# Patient Record
Sex: MALE | Race: WHITE | Employment: OTHER | URBAN - METROPOLITAN AREA
[De-identification: names, ages, dates, MRNs, and addresses within clinical notes are randomized per-mention and may not be internally consistent; named-entity substitution may affect disease eponyms.]

---

## 2017-01-03 ENCOUNTER — TELEPHONE (OUTPATIENT)
Dept: CARDIOLOGY CLINIC | Age: 82
End: 2017-01-03

## 2017-01-09 ENCOUNTER — TELEPHONE (OUTPATIENT)
Dept: PULMONOLOGY | Age: 82
End: 2017-01-09

## 2017-02-14 ENCOUNTER — TELEPHONE (OUTPATIENT)
Dept: CARDIOLOGY CLINIC | Age: 82
End: 2017-02-14

## 2017-02-21 ENCOUNTER — NURSE ONLY (OUTPATIENT)
Dept: CARDIOLOGY CLINIC | Age: 82
End: 2017-02-21

## 2017-02-21 DIAGNOSIS — Z95.0 CARDIAC PACEMAKER: ICD-10-CM

## 2017-02-21 DIAGNOSIS — R00.1 BRADYCARDIA: ICD-10-CM

## 2017-02-21 PROCEDURE — 93294 REM INTERROG EVL PM/LDLS PM: CPT | Performed by: INTERNAL MEDICINE

## 2017-02-21 PROCEDURE — 93296 REM INTERROG EVL PM/IDS: CPT | Performed by: INTERNAL MEDICINE

## 2017-03-02 ENCOUNTER — OFFICE VISIT (OUTPATIENT)
Dept: CARDIOLOGY CLINIC | Age: 82
End: 2017-03-02

## 2017-03-02 VITALS
BODY MASS INDEX: 37.31 KG/M2 | WEIGHT: 260 LBS | SYSTOLIC BLOOD PRESSURE: 120 MMHG | HEART RATE: 68 BPM | DIASTOLIC BLOOD PRESSURE: 70 MMHG

## 2017-03-02 DIAGNOSIS — Z95.0 PACEMAKER: ICD-10-CM

## 2017-03-02 DIAGNOSIS — I25.10 CAD IN NATIVE ARTERY: Primary | ICD-10-CM

## 2017-03-02 DIAGNOSIS — R94.31 ABNORMAL EKG: ICD-10-CM

## 2017-03-02 DIAGNOSIS — Z98.61 POST PTCA: ICD-10-CM

## 2017-03-02 DIAGNOSIS — I35.0 NONRHEUMATIC AORTIC VALVE STENOSIS: ICD-10-CM

## 2017-03-02 DIAGNOSIS — I44.7 LBBB (LEFT BUNDLE BRANCH BLOCK): ICD-10-CM

## 2017-03-02 DIAGNOSIS — R00.1 BRADYCARDIA: ICD-10-CM

## 2017-03-02 PROCEDURE — 1123F ACP DISCUSS/DSCN MKR DOCD: CPT | Performed by: INTERNAL MEDICINE

## 2017-03-02 PROCEDURE — 1036F TOBACCO NON-USER: CPT | Performed by: INTERNAL MEDICINE

## 2017-03-02 PROCEDURE — G8417 CALC BMI ABV UP PARAM F/U: HCPCS | Performed by: INTERNAL MEDICINE

## 2017-03-02 PROCEDURE — G8484 FLU IMMUNIZE NO ADMIN: HCPCS | Performed by: INTERNAL MEDICINE

## 2017-03-02 PROCEDURE — G8427 DOCREV CUR MEDS BY ELIG CLIN: HCPCS | Performed by: INTERNAL MEDICINE

## 2017-03-02 PROCEDURE — G8598 ASA/ANTIPLAT THER USED: HCPCS | Performed by: INTERNAL MEDICINE

## 2017-03-02 PROCEDURE — 4040F PNEUMOC VAC/ADMIN/RCVD: CPT | Performed by: INTERNAL MEDICINE

## 2017-03-02 PROCEDURE — 99214 OFFICE O/P EST MOD 30 MIN: CPT | Performed by: INTERNAL MEDICINE

## 2017-05-23 ENCOUNTER — NURSE ONLY (OUTPATIENT)
Dept: CARDIOLOGY CLINIC | Age: 82
End: 2017-05-23

## 2017-05-23 DIAGNOSIS — Z95.0 CARDIAC PACEMAKER: ICD-10-CM

## 2017-05-23 DIAGNOSIS — R00.1 BRADYCARDIA: ICD-10-CM

## 2017-05-23 PROCEDURE — 93296 REM INTERROG EVL PM/IDS: CPT | Performed by: INTERNAL MEDICINE

## 2017-05-23 PROCEDURE — 93294 REM INTERROG EVL PM/LDLS PM: CPT | Performed by: INTERNAL MEDICINE

## 2017-05-26 ENCOUNTER — OFFICE VISIT (OUTPATIENT)
Dept: CARDIOLOGY CLINIC | Age: 82
End: 2017-05-26

## 2017-05-26 VITALS
SYSTOLIC BLOOD PRESSURE: 134 MMHG | BODY MASS INDEX: 38.31 KG/M2 | HEART RATE: 80 BPM | DIASTOLIC BLOOD PRESSURE: 78 MMHG | WEIGHT: 267 LBS

## 2017-05-26 DIAGNOSIS — R00.1 BRADYCARDIA: ICD-10-CM

## 2017-05-26 DIAGNOSIS — I44.7 LBBB (LEFT BUNDLE BRANCH BLOCK): ICD-10-CM

## 2017-05-26 DIAGNOSIS — Z95.0 PACEMAKER: ICD-10-CM

## 2017-05-26 DIAGNOSIS — Z98.61 POST PTCA: ICD-10-CM

## 2017-05-26 DIAGNOSIS — I25.10 CAD IN NATIVE ARTERY: Primary | ICD-10-CM

## 2017-05-26 DIAGNOSIS — R94.31 ABNORMAL EKG: ICD-10-CM

## 2017-05-26 DIAGNOSIS — I35.0 NONRHEUMATIC AORTIC VALVE STENOSIS: ICD-10-CM

## 2017-05-26 PROCEDURE — 1123F ACP DISCUSS/DSCN MKR DOCD: CPT | Performed by: INTERNAL MEDICINE

## 2017-05-26 PROCEDURE — 99214 OFFICE O/P EST MOD 30 MIN: CPT | Performed by: INTERNAL MEDICINE

## 2017-05-26 PROCEDURE — 4040F PNEUMOC VAC/ADMIN/RCVD: CPT | Performed by: INTERNAL MEDICINE

## 2017-05-26 PROCEDURE — 1036F TOBACCO NON-USER: CPT | Performed by: INTERNAL MEDICINE

## 2017-05-26 PROCEDURE — G8417 CALC BMI ABV UP PARAM F/U: HCPCS | Performed by: INTERNAL MEDICINE

## 2017-05-26 PROCEDURE — G8427 DOCREV CUR MEDS BY ELIG CLIN: HCPCS | Performed by: INTERNAL MEDICINE

## 2017-05-26 PROCEDURE — G8598 ASA/ANTIPLAT THER USED: HCPCS | Performed by: INTERNAL MEDICINE

## 2017-05-26 RX ORDER — CETIRIZINE HYDROCHLORIDE 10 MG/1
10 TABLET ORAL DAILY
COMMUNITY
End: 2018-01-01 | Stop reason: CLARIF

## 2017-08-08 ENCOUNTER — TELEPHONE (OUTPATIENT)
Dept: CARDIOLOGY CLINIC | Age: 82
End: 2017-08-08

## 2017-09-07 ENCOUNTER — OFFICE VISIT (OUTPATIENT)
Dept: CARDIOLOGY CLINIC | Age: 82
End: 2017-09-07

## 2017-09-07 ENCOUNTER — PROCEDURE VISIT (OUTPATIENT)
Dept: CARDIOLOGY CLINIC | Age: 82
End: 2017-09-07

## 2017-09-07 VITALS — DIASTOLIC BLOOD PRESSURE: 70 MMHG | WEIGHT: 263.4 LBS | BODY MASS INDEX: 37.79 KG/M2 | SYSTOLIC BLOOD PRESSURE: 130 MMHG

## 2017-09-07 DIAGNOSIS — I44.7 LBBB (LEFT BUNDLE BRANCH BLOCK): ICD-10-CM

## 2017-09-07 DIAGNOSIS — I35.0 NONRHEUMATIC AORTIC VALVE STENOSIS: ICD-10-CM

## 2017-09-07 DIAGNOSIS — R94.31 ABNORMAL EKG: ICD-10-CM

## 2017-09-07 DIAGNOSIS — Z95.0 CARDIAC PACEMAKER: ICD-10-CM

## 2017-09-07 DIAGNOSIS — I25.10 CAD IN NATIVE ARTERY: Primary | ICD-10-CM

## 2017-09-07 DIAGNOSIS — R00.1 BRADYCARDIA: ICD-10-CM

## 2017-09-07 DIAGNOSIS — Z98.61 POST PTCA: ICD-10-CM

## 2017-09-07 DIAGNOSIS — Z95.0 PACEMAKER: ICD-10-CM

## 2017-09-07 PROCEDURE — 1036F TOBACCO NON-USER: CPT | Performed by: INTERNAL MEDICINE

## 2017-09-07 PROCEDURE — G8598 ASA/ANTIPLAT THER USED: HCPCS | Performed by: INTERNAL MEDICINE

## 2017-09-07 PROCEDURE — 99214 OFFICE O/P EST MOD 30 MIN: CPT | Performed by: INTERNAL MEDICINE

## 2017-09-07 PROCEDURE — 4040F PNEUMOC VAC/ADMIN/RCVD: CPT | Performed by: INTERNAL MEDICINE

## 2017-09-07 PROCEDURE — G8427 DOCREV CUR MEDS BY ELIG CLIN: HCPCS | Performed by: INTERNAL MEDICINE

## 2017-09-07 PROCEDURE — G8417 CALC BMI ABV UP PARAM F/U: HCPCS | Performed by: INTERNAL MEDICINE

## 2017-09-07 PROCEDURE — 93280 PM DEVICE PROGR EVAL DUAL: CPT | Performed by: INTERNAL MEDICINE

## 2017-09-07 PROCEDURE — 1123F ACP DISCUSS/DSCN MKR DOCD: CPT | Performed by: INTERNAL MEDICINE

## 2017-12-05 ENCOUNTER — PROCEDURE VISIT (OUTPATIENT)
Dept: CARDIOLOGY CLINIC | Age: 82
End: 2017-12-05

## 2017-12-05 DIAGNOSIS — R00.1 BRADYCARDIA: ICD-10-CM

## 2017-12-05 DIAGNOSIS — Z95.0 CARDIAC PACEMAKER: ICD-10-CM

## 2017-12-05 PROCEDURE — 93280 PM DEVICE PROGR EVAL DUAL: CPT | Performed by: INTERNAL MEDICINE

## 2017-12-06 ENCOUNTER — OFFICE VISIT (OUTPATIENT)
Dept: CARDIOLOGY CLINIC | Age: 82
End: 2017-12-06

## 2017-12-06 VITALS
WEIGHT: 271 LBS | SYSTOLIC BLOOD PRESSURE: 128 MMHG | HEART RATE: 70 BPM | BODY MASS INDEX: 38.88 KG/M2 | DIASTOLIC BLOOD PRESSURE: 70 MMHG

## 2017-12-06 DIAGNOSIS — Z98.61 POST PTCA: ICD-10-CM

## 2017-12-06 DIAGNOSIS — I44.7 LBBB (LEFT BUNDLE BRANCH BLOCK): ICD-10-CM

## 2017-12-06 DIAGNOSIS — I25.10 CAD IN NATIVE ARTERY: Primary | ICD-10-CM

## 2017-12-06 DIAGNOSIS — R00.1 BRADYCARDIA: ICD-10-CM

## 2017-12-06 DIAGNOSIS — Z95.0 PACEMAKER: ICD-10-CM

## 2017-12-06 DIAGNOSIS — R94.31 ABNORMAL EKG: ICD-10-CM

## 2017-12-06 DIAGNOSIS — I35.0 NONRHEUMATIC AORTIC VALVE STENOSIS: ICD-10-CM

## 2017-12-06 PROCEDURE — 1123F ACP DISCUSS/DSCN MKR DOCD: CPT | Performed by: INTERNAL MEDICINE

## 2017-12-06 PROCEDURE — 99214 OFFICE O/P EST MOD 30 MIN: CPT | Performed by: INTERNAL MEDICINE

## 2017-12-06 PROCEDURE — 4040F PNEUMOC VAC/ADMIN/RCVD: CPT | Performed by: INTERNAL MEDICINE

## 2017-12-06 PROCEDURE — 1036F TOBACCO NON-USER: CPT | Performed by: INTERNAL MEDICINE

## 2017-12-06 PROCEDURE — G8598 ASA/ANTIPLAT THER USED: HCPCS | Performed by: INTERNAL MEDICINE

## 2017-12-06 PROCEDURE — G8417 CALC BMI ABV UP PARAM F/U: HCPCS | Performed by: INTERNAL MEDICINE

## 2017-12-06 PROCEDURE — G8484 FLU IMMUNIZE NO ADMIN: HCPCS | Performed by: INTERNAL MEDICINE

## 2017-12-06 PROCEDURE — G8427 DOCREV CUR MEDS BY ELIG CLIN: HCPCS | Performed by: INTERNAL MEDICINE

## 2017-12-06 NOTE — PROGRESS NOTES
Subjective:      Patient ID: Ignacio Mireles is a 80 y.o. male. HPI: Patient is here for  follow up pacemaker implant, AS, CAD,PTCA, bradycardia and AS. Has prostate Ca. Has met to spine. Has back discomfort. Has significant fatigue. Breathing unchanged. No chest pain. No edema. 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    Obesity     Prostate CA ([facility] [address] 4/21/2015    Prostate cancer ([facility] [address]     SOB (shortness of breath) on exertion     Unspecified sleep apnea     uses cpap     Past Surgical History:   Procedure Laterality Date    APPENDECTOMY      CHOLECYSTECTOMY, LAPAROSCOPIC  12/30/2011    WITH CHOLANGIOGRAM    COLONOSCOPY      COSMETIC SURGERY      FRACTURE SURGERY      HERNIA REPAIR      JOINT REPLACEMENT      left knee    MYRINGOTOMY Left 8/12/13    LEFT INSERTION OF PE TUBE LEFT, NASOPHARYNGOSCOPY    PTCA      SHOULDER ARTHROPLASTY      LT    TYMPANOSTOMY TUBE PLACEMENT  [id number]    MYRINGOTOMY   INSERTION OF PE TUBE -LEFT,EVALUATION OF     Social History     Social History    Marital status:      Spouse name: N/A    Number of children: N/A    Years of education: N/A     Occupational History     Retired     Social History Main Topics    Smoking status: Never Smoker    Smokeless tobacco: Never Used    Alcohol use No    Drug use: No    Sexual activity: Not on file     Other Topics Concern    Not on file     Social History Narrative    No narrative on file     FH reviewed    Review of Systems   Constitutional: Negative for activity change. Having significant  fatigue. Respiratory: Negative for chest tightness. Negative for apnea, cough, choking.   Has chronic shortness of breath. Cardiovascular: Negative for chest pain, palpitations and leg swelling. No PND or orthopnea. No tachycardia. Gastrointestinal: Negative for abdominal distention. Musculoskeletal: Negative for myalgias. Neurological: Negative for dizziness, syncope and light-headedness. Psychiatric/Behavioral: Negative for behavioral problems, confusion and agitation. Other systems reviewed negative as done. Objective:   Physical Exam   Constitutional: He is oriented to person, place, and time. He appears well-developed and well-nourished. No distress. HENT:   Head: Normocephalic and atraumatic. Eyes: Conjunctivae and EOM are normal. Right eye exhibits no discharge. Left eye exhibits no discharge. Neck: Normal range of motion. Neck supple. No JVD present. Cardiovascular: Normal rate, regular rhythm, S1 normal, S2 normal and normal heart sounds. Exam reveals no gallop. 1/2 syst murmur heard. Pulses:       Radial pulses are 2+ on the right side, and 2+ on the left side. Pulmonary/Chest: Effort normal, decreased BS. No respiratory distress. He has wheezes. He has no rales. Abdominal: Soft. Bowel sounds are normal. There is no tenderness. Musculoskeletal: Normal range of motion. He exhibits tr edema. Neurological: He is alert and oriented to person, place, and time. Skin: Skin is warm and dry. Psychiatric: He has a normal mood and affect. His behavior is normal. Thought content normal.       Assessment:      1. CAD in native artery     2. Post PTCA     3. Bradycardia     4. Pacemaker     5. Nonrheumatic aortic valve stenosis     6. LBBB (left bundle branch block)     7. Abnormal EKG             Plan:      CV stable. Now with metastatic prostate Ca which has mets to spine which explains most of his sx. (found on recent bone scan.)   No angina. Having asthma. No sx. Compensated. No syncope. EP following pacemaker. Encouraged diet and wt loss.   Reviewed previous

## 2018-01-01 PROBLEM — R06.09 DYSPNEA ON EXERTION: Status: ACTIVE | Noted: 2018-01-01

## 2018-01-02 PROBLEM — I50.41 ACUTE COMBINED SYSTOLIC AND DIASTOLIC CONGESTIVE HEART FAILURE (HCC): Status: ACTIVE | Noted: 2018-01-02

## 2018-01-02 PROBLEM — I42.0 DILATED CARDIOMYOPATHY (HCC): Status: ACTIVE | Noted: 2018-01-02

## 2018-01-03 ENCOUNTER — TELEPHONE (OUTPATIENT)
Dept: CARDIOLOGY CLINIC | Age: 83
End: 2018-01-03

## 2018-01-10 ENCOUNTER — OFFICE VISIT (OUTPATIENT)
Dept: CARDIOLOGY CLINIC | Age: 83
End: 2018-01-10

## 2018-01-10 VITALS
BODY MASS INDEX: 37.74 KG/M2 | HEART RATE: 60 BPM | DIASTOLIC BLOOD PRESSURE: 64 MMHG | WEIGHT: 263 LBS | SYSTOLIC BLOOD PRESSURE: 126 MMHG

## 2018-01-10 DIAGNOSIS — I44.7 LBBB (LEFT BUNDLE BRANCH BLOCK): ICD-10-CM

## 2018-01-10 DIAGNOSIS — I35.0 NONRHEUMATIC AORTIC VALVE STENOSIS: Primary | ICD-10-CM

## 2018-01-10 DIAGNOSIS — I25.10 CAD IN NATIVE ARTERY: ICD-10-CM

## 2018-01-10 DIAGNOSIS — Z98.61 POST PTCA: ICD-10-CM

## 2018-01-10 DIAGNOSIS — I50.22 CHRONIC SYSTOLIC CONGESTIVE HEART FAILURE (HCC): ICD-10-CM

## 2018-01-10 DIAGNOSIS — R00.1 BRADYCARDIA: ICD-10-CM

## 2018-01-10 DIAGNOSIS — Z95.0 PACEMAKER: ICD-10-CM

## 2018-01-10 PROCEDURE — G8427 DOCREV CUR MEDS BY ELIG CLIN: HCPCS | Performed by: INTERNAL MEDICINE

## 2018-01-10 PROCEDURE — 1123F ACP DISCUSS/DSCN MKR DOCD: CPT | Performed by: INTERNAL MEDICINE

## 2018-01-10 PROCEDURE — 99214 OFFICE O/P EST MOD 30 MIN: CPT | Performed by: INTERNAL MEDICINE

## 2018-01-10 PROCEDURE — 1036F TOBACCO NON-USER: CPT | Performed by: INTERNAL MEDICINE

## 2018-01-10 PROCEDURE — G8484 FLU IMMUNIZE NO ADMIN: HCPCS | Performed by: INTERNAL MEDICINE

## 2018-01-10 PROCEDURE — 1111F DSCHRG MED/CURRENT MED MERGE: CPT | Performed by: INTERNAL MEDICINE

## 2018-01-10 PROCEDURE — G8417 CALC BMI ABV UP PARAM F/U: HCPCS | Performed by: INTERNAL MEDICINE

## 2018-01-10 PROCEDURE — G8598 ASA/ANTIPLAT THER USED: HCPCS | Performed by: INTERNAL MEDICINE

## 2018-01-10 PROCEDURE — 4040F PNEUMOC VAC/ADMIN/RCVD: CPT | Performed by: INTERNAL MEDICINE

## 2018-01-10 RX ORDER — FUROSEMIDE 40 MG/1
80 TABLET ORAL 2 TIMES DAILY
Qty: 120 TABLET | Refills: 6 | Status: ON HOLD | OUTPATIENT
Start: 2018-01-10 | End: 2018-06-13

## 2018-01-10 NOTE — PROGRESS NOTES
Metastatic prostate Ca which has mets to spine which explains most of his sx. (found on recent bone scan.)   No angina. Having asthma. No sx. Compensated. No syncope. EP following pacemaker. Encouraged diet and wt loss. Reviewed previous records and testing including myoview 7/15. Follow up 1-2 wks.

## 2018-01-24 ENCOUNTER — TELEPHONE (OUTPATIENT)
Dept: CARDIOLOGY CLINIC | Age: 83
End: 2018-01-24

## 2018-02-02 ENCOUNTER — TELEPHONE (OUTPATIENT)
Dept: CARDIOLOGY CLINIC | Age: 83
End: 2018-02-02

## 2018-02-02 RX ORDER — METOLAZONE 2.5 MG/1
2.5 TABLET ORAL ONCE
Qty: 1 TABLET | Refills: 0 | Status: SHIPPED | OUTPATIENT
Start: 2018-02-02 | End: 2018-03-28

## 2018-02-06 ENCOUNTER — OFFICE VISIT (OUTPATIENT)
Dept: CARDIOLOGY CLINIC | Age: 83
End: 2018-02-06

## 2018-02-06 VITALS
BODY MASS INDEX: 38.22 KG/M2 | DIASTOLIC BLOOD PRESSURE: 80 MMHG | HEART RATE: 60 BPM | WEIGHT: 266.4 LBS | SYSTOLIC BLOOD PRESSURE: 132 MMHG

## 2018-02-06 DIAGNOSIS — I35.0 NONRHEUMATIC AORTIC VALVE STENOSIS: ICD-10-CM

## 2018-02-06 DIAGNOSIS — I42.0 DILATED CARDIOMYOPATHY (HCC): ICD-10-CM

## 2018-02-06 DIAGNOSIS — I25.10 CAD IN NATIVE ARTERY: ICD-10-CM

## 2018-02-06 DIAGNOSIS — R00.1 BRADYCARDIA: ICD-10-CM

## 2018-02-06 DIAGNOSIS — I44.7 LBBB (LEFT BUNDLE BRANCH BLOCK): ICD-10-CM

## 2018-02-06 DIAGNOSIS — I50.22 CHRONIC SYSTOLIC CONGESTIVE HEART FAILURE (HCC): Primary | ICD-10-CM

## 2018-02-06 DIAGNOSIS — Z98.61 POST PTCA: ICD-10-CM

## 2018-02-06 PROCEDURE — 1123F ACP DISCUSS/DSCN MKR DOCD: CPT | Performed by: INTERNAL MEDICINE

## 2018-02-06 PROCEDURE — 1036F TOBACCO NON-USER: CPT | Performed by: INTERNAL MEDICINE

## 2018-02-06 PROCEDURE — G8417 CALC BMI ABV UP PARAM F/U: HCPCS | Performed by: INTERNAL MEDICINE

## 2018-02-06 PROCEDURE — G8598 ASA/ANTIPLAT THER USED: HCPCS | Performed by: INTERNAL MEDICINE

## 2018-02-06 PROCEDURE — G8484 FLU IMMUNIZE NO ADMIN: HCPCS | Performed by: INTERNAL MEDICINE

## 2018-02-06 PROCEDURE — 99214 OFFICE O/P EST MOD 30 MIN: CPT | Performed by: INTERNAL MEDICINE

## 2018-02-06 PROCEDURE — G8427 DOCREV CUR MEDS BY ELIG CLIN: HCPCS | Performed by: INTERNAL MEDICINE

## 2018-02-06 PROCEDURE — 4040F PNEUMOC VAC/ADMIN/RCVD: CPT | Performed by: INTERNAL MEDICINE

## 2018-02-06 NOTE — PROGRESS NOTES
Subjective:      Patient ID: Lyndsey Rivero is a 80 y.o. male. HPI: Patient is here for  follow up pacemaker implant, AS, CAD,PTCA, bradycardia and AS. Has prostate Ca. Has met to spine. Has had nausea,incontinance. Thinks he has flu. Sinus issues. Has back discomfort which is worse. No fever/chills. Breathing unchanged. No chest pain. No edema. No orthopnea.          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    Obesity     Prostate CA ([facility] [address] 4/21/2015    Prostate cancer ([facility] [address]     SOB (shortness of breath) on exertion     Unspecified sleep apnea     uses cpap     Past Surgical History:   Procedure Laterality Date    APPENDECTOMY      CHOLECYSTECTOMY, LAPAROSCOPIC  12/30/2011    WITH CHOLANGIOGRAM    COLONOSCOPY      COSMETIC SURGERY      FRACTURE SURGERY      HERNIA REPAIR      JOINT REPLACEMENT      left knee    MYRINGOTOMY Left 8/12/13    LEFT INSERTION OF PE TUBE LEFT, NASOPHARYNGOSCOPY    PTCA      SHOULDER ARTHROPLASTY      LT    TYMPANOSTOMY TUBE PLACEMENT  [id number]    MYRINGOTOMY   INSERTION OF PE TUBE -LEFT,EVALUATION OF     Social History     Social History    Marital status:      Spouse name: N/A    Number of children: N/A    Years of education: N/A     Occupational History     Retired     Social History Main Topics    Smoking status: Never Smoker    Smokeless tobacco: Never Used    Alcohol use No    Drug use: No    Sexual activity: Not on file     Other Topics Concern    Not on file     Social History Narrative    No narrative on file     [facility] reviewed    Vitals:    02/06/18

## 2018-02-27 ENCOUNTER — OFFICE VISIT (OUTPATIENT)
Dept: CARDIOLOGY CLINIC | Age: 83
End: 2018-02-27

## 2018-02-27 ENCOUNTER — PROCEDURE VISIT (OUTPATIENT)
Dept: CARDIOLOGY CLINIC | Age: 83
End: 2018-02-27

## 2018-02-27 VITALS
HEART RATE: 62 BPM | WEIGHT: 265 LBS | DIASTOLIC BLOOD PRESSURE: 72 MMHG | SYSTOLIC BLOOD PRESSURE: 128 MMHG | BODY MASS INDEX: 38.02 KG/M2

## 2018-02-27 DIAGNOSIS — I42.0 DILATED CARDIOMYOPATHY (HCC): ICD-10-CM

## 2018-02-27 DIAGNOSIS — I35.0 NONRHEUMATIC AORTIC VALVE STENOSIS: ICD-10-CM

## 2018-02-27 DIAGNOSIS — I44.7 LBBB (LEFT BUNDLE BRANCH BLOCK): ICD-10-CM

## 2018-02-27 DIAGNOSIS — I50.22 CHRONIC SYSTOLIC CONGESTIVE HEART FAILURE (HCC): Primary | ICD-10-CM

## 2018-02-27 DIAGNOSIS — Z98.61 POST PTCA: ICD-10-CM

## 2018-02-27 DIAGNOSIS — R00.1 BRADYCARDIA: ICD-10-CM

## 2018-02-27 DIAGNOSIS — I25.10 CAD IN NATIVE ARTERY: ICD-10-CM

## 2018-02-27 DIAGNOSIS — Z95.0 PACEMAKER: Primary | ICD-10-CM

## 2018-02-27 PROCEDURE — G8417 CALC BMI ABV UP PARAM F/U: HCPCS | Performed by: INTERNAL MEDICINE

## 2018-02-27 PROCEDURE — 1123F ACP DISCUSS/DSCN MKR DOCD: CPT | Performed by: INTERNAL MEDICINE

## 2018-02-27 PROCEDURE — 99214 OFFICE O/P EST MOD 30 MIN: CPT | Performed by: INTERNAL MEDICINE

## 2018-02-27 PROCEDURE — G8427 DOCREV CUR MEDS BY ELIG CLIN: HCPCS | Performed by: INTERNAL MEDICINE

## 2018-02-27 PROCEDURE — 1036F TOBACCO NON-USER: CPT | Performed by: INTERNAL MEDICINE

## 2018-02-27 PROCEDURE — G8484 FLU IMMUNIZE NO ADMIN: HCPCS | Performed by: INTERNAL MEDICINE

## 2018-02-27 PROCEDURE — G8598 ASA/ANTIPLAT THER USED: HCPCS | Performed by: INTERNAL MEDICINE

## 2018-02-27 PROCEDURE — 4040F PNEUMOC VAC/ADMIN/RCVD: CPT | Performed by: INTERNAL MEDICINE

## 2018-02-27 NOTE — PROGRESS NOTES
Subjective:      Patient ID: Adarsh Scruggs is a 80 y.o. male. HPI: Patient is here for  follow up pacemaker implant, AS, CAD,PTCA, bradycardia and AS. Has prostate Ca. Has met to spine. He feels weakness is his biggest issues. Breathing is not the issue. No fever/chills. No chest pain. No edema. No orthopnea. 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    Obesity     Prostate CA ([facility] [address] 4/21/2015    Prostate cancer ([facility] [address]     SOB (shortness of breath) on exertion     Unspecified sleep apnea     uses cpap     Past Surgical History:   Procedure Laterality Date    APPENDECTOMY      CHOLECYSTECTOMY, LAPAROSCOPIC  12/30/2011    WITH CHOLANGIOGRAM    COLONOSCOPY      COSMETIC SURGERY      FRACTURE SURGERY      HERNIA REPAIR      JOINT REPLACEMENT      left knee    MYRINGOTOMY Left 8/12/13    LEFT INSERTION OF PE TUBE LEFT, NASOPHARYNGOSCOPY    PTCA      SHOULDER ARTHROPLASTY      LT    TYMPANOSTOMY TUBE PLACEMENT  [id number]    MYRINGOTOMY   INSERTION OF PE TUBE -LEFT,EVALUATION OF     Social History     Social History    Marital status:      Spouse name: N/A    Number of children: N/A    Years of education: N/A     Occupational History     Retired     Social History Main Topics    Smoking status: Never Smoker    Smokeless tobacco: Never Used    Alcohol use No    Drug use: No    Sexual activity: Not on file     Other Topics Concern    Not on file     Social History Narrative    No narrative on file     FH reviewed      Review of Systems   Constitutional: Negative for activity change.  Having

## 2018-03-15 ENCOUNTER — PROCEDURE VISIT (OUTPATIENT)
Dept: CARDIOLOGY CLINIC | Age: 83
End: 2018-03-15

## 2018-03-15 ENCOUNTER — OFFICE VISIT (OUTPATIENT)
Dept: CARDIOLOGY CLINIC | Age: 83
End: 2018-03-15

## 2018-03-15 VITALS
SYSTOLIC BLOOD PRESSURE: 130 MMHG | OXYGEN SATURATION: 98 % | RESPIRATION RATE: 16 BRPM | HEIGHT: 70 IN | BODY MASS INDEX: 37.94 KG/M2 | WEIGHT: 265 LBS | HEART RATE: 71 BPM | DIASTOLIC BLOOD PRESSURE: 80 MMHG

## 2018-03-15 DIAGNOSIS — I50.22 CHRONIC SYSTOLIC CONGESTIVE HEART FAILURE (HCC): Primary | ICD-10-CM

## 2018-03-15 DIAGNOSIS — Z95.0 CARDIAC PACEMAKER: ICD-10-CM

## 2018-03-15 DIAGNOSIS — I44.7 LBBB (LEFT BUNDLE BRANCH BLOCK): ICD-10-CM

## 2018-03-15 DIAGNOSIS — Z95.0 PACEMAKER: ICD-10-CM

## 2018-03-15 DIAGNOSIS — I35.0 NONRHEUMATIC AORTIC VALVE STENOSIS: ICD-10-CM

## 2018-03-15 DIAGNOSIS — I25.10 CAD IN NATIVE ARTERY: ICD-10-CM

## 2018-03-15 DIAGNOSIS — I42.0 DILATED CARDIOMYOPATHY (HCC): ICD-10-CM

## 2018-03-15 DIAGNOSIS — Z98.61 POST PTCA: ICD-10-CM

## 2018-03-15 DIAGNOSIS — R00.1 BRADYCARDIA: ICD-10-CM

## 2018-03-15 PROCEDURE — 4040F PNEUMOC VAC/ADMIN/RCVD: CPT | Performed by: INTERNAL MEDICINE

## 2018-03-15 PROCEDURE — G8484 FLU IMMUNIZE NO ADMIN: HCPCS | Performed by: INTERNAL MEDICINE

## 2018-03-15 PROCEDURE — 93280 PM DEVICE PROGR EVAL DUAL: CPT | Performed by: INTERNAL MEDICINE

## 2018-03-15 PROCEDURE — 99214 OFFICE O/P EST MOD 30 MIN: CPT | Performed by: INTERNAL MEDICINE

## 2018-03-15 PROCEDURE — G8427 DOCREV CUR MEDS BY ELIG CLIN: HCPCS | Performed by: INTERNAL MEDICINE

## 2018-03-15 PROCEDURE — 1036F TOBACCO NON-USER: CPT | Performed by: INTERNAL MEDICINE

## 2018-03-15 PROCEDURE — G8598 ASA/ANTIPLAT THER USED: HCPCS | Performed by: INTERNAL MEDICINE

## 2018-03-15 PROCEDURE — G8417 CALC BMI ABV UP PARAM F/U: HCPCS | Performed by: INTERNAL MEDICINE

## 2018-03-15 PROCEDURE — 1123F ACP DISCUSS/DSCN MKR DOCD: CPT | Performed by: INTERNAL MEDICINE

## 2018-03-15 NOTE — PROGRESS NOTES
Interrogation and programming evaluation of the device shows normal function, with stable sensing and pacing thresholds. See interrogation for details. The pt saw Dr Leann Law today. Recheck remotely 3 mos.

## 2018-03-28 ENCOUNTER — OFFICE VISIT (OUTPATIENT)
Dept: ENT CLINIC | Age: 83
End: 2018-03-28

## 2018-03-28 VITALS — OXYGEN SATURATION: 95 % | DIASTOLIC BLOOD PRESSURE: 82 MMHG | SYSTOLIC BLOOD PRESSURE: 120 MMHG | HEART RATE: 76 BPM

## 2018-03-28 DIAGNOSIS — J30.2 CHRONIC SEASONAL ALLERGIC RHINITIS, UNSPECIFIED TRIGGER: Primary | ICD-10-CM

## 2018-03-28 PROCEDURE — G8427 DOCREV CUR MEDS BY ELIG CLIN: HCPCS | Performed by: OTOLARYNGOLOGY

## 2018-03-28 PROCEDURE — 4040F PNEUMOC VAC/ADMIN/RCVD: CPT | Performed by: OTOLARYNGOLOGY

## 2018-03-28 PROCEDURE — 99214 OFFICE O/P EST MOD 30 MIN: CPT | Performed by: OTOLARYNGOLOGY

## 2018-03-28 PROCEDURE — G8417 CALC BMI ABV UP PARAM F/U: HCPCS | Performed by: OTOLARYNGOLOGY

## 2018-03-28 PROCEDURE — 1123F ACP DISCUSS/DSCN MKR DOCD: CPT | Performed by: OTOLARYNGOLOGY

## 2018-03-28 PROCEDURE — G8598 ASA/ANTIPLAT THER USED: HCPCS | Performed by: OTOLARYNGOLOGY

## 2018-03-28 PROCEDURE — 1036F TOBACCO NON-USER: CPT | Performed by: OTOLARYNGOLOGY

## 2018-03-28 PROCEDURE — G8484 FLU IMMUNIZE NO ADMIN: HCPCS | Performed by: OTOLARYNGOLOGY

## 2018-03-28 RX ORDER — FLUTICASONE PROPIONATE 50 MCG
1 SPRAY, SUSPENSION (ML) NASAL DAILY
Qty: 1 BOTTLE | Refills: 1 | Status: SHIPPED | OUTPATIENT
Start: 2018-03-28 | End: 2019-09-04 | Stop reason: SDUPTHER

## 2018-03-28 NOTE — PROGRESS NOTES
The patient is an 66-year-old who is referred here from the [facility] emergency unit after he presented there recently with complaints of ongoing lightheadedness     The workup did not show any acute neurological or cardiovascular events and they suspected his sinuses were involved. The patient has had a slightly viscous noncolored drainage requiring him to clear his throat. There has been some pressure in his chest as well which she suspects may be due from the drainage from above. He also gives a lengthy history of recent treatment for metastatic prostate cancer and congestive heart failure. His recent visit to his cardiologist was unremarkable and he believes his blood pressure is under control. There is an ongoing lightheaded sensation that is not related to position. There has been no subjective or objective vertigo nor any associated nausea. He denies any change in vision or loss of consciousness. There has been no recent ear trauma or barotrauma. He denies any severe headaches, but rather a vague lightheaded sensation that makes him tentative when he walks. The patient has been known to us in the past for seasonal allergies. He had a tympanostomy tube placed elsewhere for ongoing left eustachian tube dysfunction. He is also followed for hypertension, hyperlipidemia, obstructive sleep apnea, and prediabetes. The patient is a non smoker and is not exposed to second hand smoke or irritants. There have been no known contagious contacts. There are  no other symptoms referable to the upper aerodigestive tract. GENERAL:   The patient appears well developed and well nourished. The head is normocephalic and atraumatic; no facial scars or weakness are noted. The facial skeleton is normal.The voice has a normal quality and tonality to it. EARS:  The pinnae are normal bilaterally. The ear canals are clear with no cerumen or narrowing.   Both eardrums are normal Impression:    Mild chronic rhinitis, no suggestion of secondary sinusitis or otitis    No suggestion of vestibular etiology for his disequilibrium    Several comorbidities may be contributing as well    Plan:  Begin fluticasone 2 puffs daily  Balance precautions discussed

## 2018-04-09 ENCOUNTER — OFFICE VISIT (OUTPATIENT)
Dept: CARDIOLOGY CLINIC | Age: 83
End: 2018-04-09

## 2018-04-09 VITALS
BODY MASS INDEX: 39.03 KG/M2 | DIASTOLIC BLOOD PRESSURE: 80 MMHG | SYSTOLIC BLOOD PRESSURE: 134 MMHG | WEIGHT: 272 LBS | HEART RATE: 68 BPM

## 2018-04-09 DIAGNOSIS — Z95.0 PACEMAKER: ICD-10-CM

## 2018-04-09 DIAGNOSIS — Z98.61 HISTORY OF PTCA: ICD-10-CM

## 2018-04-09 DIAGNOSIS — I25.10 CAD IN NATIVE ARTERY: ICD-10-CM

## 2018-04-09 DIAGNOSIS — I35.0 NONRHEUMATIC AORTIC VALVE STENOSIS: ICD-10-CM

## 2018-04-09 DIAGNOSIS — I44.7 LBBB (LEFT BUNDLE BRANCH BLOCK): ICD-10-CM

## 2018-04-09 DIAGNOSIS — R00.1 BRADYCARDIA: ICD-10-CM

## 2018-04-09 DIAGNOSIS — I50.22 CHRONIC SYSTOLIC CONGESTIVE HEART FAILURE (HCC): Primary | ICD-10-CM

## 2018-04-09 DIAGNOSIS — I42.0 DILATED CARDIOMYOPATHY (HCC): ICD-10-CM

## 2018-04-09 PROCEDURE — 1036F TOBACCO NON-USER: CPT | Performed by: INTERNAL MEDICINE

## 2018-04-09 PROCEDURE — G8428 CUR MEDS NOT DOCUMENT: HCPCS | Performed by: INTERNAL MEDICINE

## 2018-04-09 PROCEDURE — 1123F ACP DISCUSS/DSCN MKR DOCD: CPT | Performed by: INTERNAL MEDICINE

## 2018-04-09 PROCEDURE — 4040F PNEUMOC VAC/ADMIN/RCVD: CPT | Performed by: INTERNAL MEDICINE

## 2018-04-09 PROCEDURE — G8598 ASA/ANTIPLAT THER USED: HCPCS | Performed by: INTERNAL MEDICINE

## 2018-04-09 PROCEDURE — 99214 OFFICE O/P EST MOD 30 MIN: CPT | Performed by: INTERNAL MEDICINE

## 2018-04-09 PROCEDURE — G8417 CALC BMI ABV UP PARAM F/U: HCPCS | Performed by: INTERNAL MEDICINE

## 2018-05-03 ENCOUNTER — OFFICE VISIT (OUTPATIENT)
Dept: CARDIOLOGY CLINIC | Age: 83
End: 2018-05-03

## 2018-05-03 VITALS
HEART RATE: 66 BPM | DIASTOLIC BLOOD PRESSURE: 70 MMHG | HEIGHT: 70 IN | BODY MASS INDEX: 37.51 KG/M2 | SYSTOLIC BLOOD PRESSURE: 140 MMHG | WEIGHT: 262 LBS

## 2018-05-03 DIAGNOSIS — I35.0 NONRHEUMATIC AORTIC VALVE STENOSIS: Primary | ICD-10-CM

## 2018-05-03 DIAGNOSIS — R00.1 BRADYCARDIA: ICD-10-CM

## 2018-05-03 DIAGNOSIS — I25.10 CORONARY ARTERY DISEASE DUE TO LIPID RICH PLAQUE: ICD-10-CM

## 2018-05-03 DIAGNOSIS — Z95.0 PACEMAKER: ICD-10-CM

## 2018-05-03 DIAGNOSIS — I42.0 DILATED CARDIOMYOPATHY (HCC): ICD-10-CM

## 2018-05-03 DIAGNOSIS — I25.83 CORONARY ARTERY DISEASE DUE TO LIPID RICH PLAQUE: ICD-10-CM

## 2018-05-03 PROCEDURE — 4040F PNEUMOC VAC/ADMIN/RCVD: CPT | Performed by: INTERNAL MEDICINE

## 2018-05-03 PROCEDURE — G8427 DOCREV CUR MEDS BY ELIG CLIN: HCPCS | Performed by: INTERNAL MEDICINE

## 2018-05-03 PROCEDURE — G8598 ASA/ANTIPLAT THER USED: HCPCS | Performed by: INTERNAL MEDICINE

## 2018-05-03 PROCEDURE — 1036F TOBACCO NON-USER: CPT | Performed by: INTERNAL MEDICINE

## 2018-05-03 PROCEDURE — 1123F ACP DISCUSS/DSCN MKR DOCD: CPT | Performed by: INTERNAL MEDICINE

## 2018-05-03 PROCEDURE — 99214 OFFICE O/P EST MOD 30 MIN: CPT | Performed by: INTERNAL MEDICINE

## 2018-05-03 PROCEDURE — G8417 CALC BMI ABV UP PARAM F/U: HCPCS | Performed by: INTERNAL MEDICINE

## 2018-05-07 ENCOUNTER — TELEPHONE (OUTPATIENT)
Dept: CARDIOLOGY CLINIC | Age: 83
End: 2018-05-07

## 2018-05-10 ENCOUNTER — HOSPITAL ENCOUNTER (OUTPATIENT)
Dept: NON INVASIVE DIAGNOSTICS | Age: 83
Discharge: HOME | End: 2018-05-10
Attending: INTERNAL MEDICINE | Admitting: INTERNAL MEDICINE

## 2018-05-10 VITALS — WEIGHT: 260.14 LBS | BODY MASS INDEX: 37.33 KG/M2

## 2018-05-10 DIAGNOSIS — I35.0 NONRHEUMATIC AORTIC VALVE STENOSIS: ICD-10-CM

## 2018-05-10 RX ORDER — DOBUTAMINE HYDROCHLORIDE 200 MG/100ML
10 INJECTION INTRAVENOUS CONTINUOUS
Status: DISCONTINUED | OUTPATIENT
Start: 2018-05-10 | End: 2018-05-10 | Stop reason: SDUPTHER

## 2018-05-10 RX ORDER — DOBUTAMINE HYDROCHLORIDE 200 MG/100ML
10 INJECTION INTRAVENOUS CONTINUOUS
Status: DISCONTINUED | OUTPATIENT
Start: 2018-05-10 | End: 2018-05-11 | Stop reason: HOSPADM

## 2018-05-10 RX ADMIN — DOBUTAMINE HYDROCHLORIDE 7.5 MCG/KG/MIN: 200 INJECTION INTRAVENOUS at 10:30

## 2018-05-11 ENCOUNTER — TELEPHONE (OUTPATIENT)
Dept: CARDIOLOGY | Age: 83
End: 2018-05-11

## 2018-05-11 DIAGNOSIS — I35.0 AORTIC STENOSIS, MODERATE: Primary | Chronic | ICD-10-CM

## 2018-05-15 ENCOUNTER — TELEPHONE (OUTPATIENT)
Dept: CARDIOLOGY CLINIC | Age: 83
End: 2018-05-15

## 2018-05-15 DIAGNOSIS — I25.83 CORONARY ARTERY DISEASE DUE TO LIPID RICH PLAQUE: Primary | Chronic | ICD-10-CM

## 2018-05-15 DIAGNOSIS — I65.23 OCCLUSION AND STENOSIS OF BILATERAL CAROTID ARTERIES: ICD-10-CM

## 2018-05-15 DIAGNOSIS — I25.10 CORONARY ARTERY DISEASE DUE TO LIPID RICH PLAQUE: Primary | Chronic | ICD-10-CM

## 2018-05-15 DIAGNOSIS — I35.0 AORTIC STENOSIS, MODERATE: Chronic | ICD-10-CM

## 2018-05-18 ENCOUNTER — TELEPHONE (OUTPATIENT)
Dept: CARDIOLOGY CLINIC | Age: 83
End: 2018-05-18

## 2018-05-22 ENCOUNTER — HOSPITAL ENCOUNTER (OUTPATIENT)
Dept: CT IMAGING | Age: 83
Discharge: HOME | End: 2018-05-22
Attending: INTERNAL MEDICINE | Admitting: INTERNAL MEDICINE

## 2018-05-22 DIAGNOSIS — I35.0 AORTIC STENOSIS, MODERATE: Chronic | ICD-10-CM

## 2018-05-22 DIAGNOSIS — I25.10 ATHEROSCLEROTIC HEART DISEASE OF NATIVE CORONARY ARTERY WITHOUT ANGINA PECTORIS: ICD-10-CM

## 2018-05-23 ENCOUNTER — TELEPHONE (OUTPATIENT)
Dept: CARDIOLOGY CLINIC | Age: 83
End: 2018-05-23

## 2018-06-05 ENCOUNTER — TELEPHONE (OUTPATIENT)
Dept: CARDIOLOGY CLINIC | Age: 83
End: 2018-06-05

## 2018-06-06 ENCOUNTER — OFFICE VISIT (OUTPATIENT)
Dept: CARDIOTHORACIC SURGERY | Age: 83
End: 2018-06-06

## 2018-06-06 VITALS
SYSTOLIC BLOOD PRESSURE: 130 MMHG | OXYGEN SATURATION: 96 % | TEMPERATURE: 98.2 F | HEIGHT: 70 IN | HEART RATE: 69 BPM | BODY MASS INDEX: 37.94 KG/M2 | DIASTOLIC BLOOD PRESSURE: 70 MMHG | WEIGHT: 265 LBS

## 2018-06-06 DIAGNOSIS — I35.0 NONRHEUMATIC AORTIC VALVE STENOSIS: Primary | ICD-10-CM

## 2018-06-06 DIAGNOSIS — I10 ESSENTIAL HYPERTENSION: ICD-10-CM

## 2018-06-06 DIAGNOSIS — E66.9 OBESITY (BMI 35.0-39.9 WITHOUT COMORBIDITY): ICD-10-CM

## 2018-06-06 PROCEDURE — 99213 OFFICE O/P EST LOW 20 MIN: CPT | Performed by: THORACIC SURGERY (CARDIOTHORACIC VASCULAR SURGERY)

## 2018-06-06 ASSESSMENT — ENCOUNTER SYMPTOMS
EYE REDNESS: 0
SHORTNESS OF BREATH: 1
BACK PAIN: 0
BLOOD IN STOOL: 0
ABDOMINAL PAIN: 0
COUGH: 0
CONSTIPATION: 1
ALLERGIC/IMMUNOLOGIC NEGATIVE: 1
EYE PAIN: 0
CHEST TIGHTNESS: 1

## 2018-06-07 ENCOUNTER — OFFICE VISIT (OUTPATIENT)
Dept: CARDIOLOGY CLINIC | Age: 83
End: 2018-06-07

## 2018-06-07 ENCOUNTER — HOSPITAL ENCOUNTER (OUTPATIENT)
Dept: OTHER | Age: 83
Discharge: HOME | End: 2018-06-07
Attending: INTERNAL MEDICINE | Admitting: INTERNAL MEDICINE

## 2018-06-07 VITALS
DIASTOLIC BLOOD PRESSURE: 80 MMHG | BODY MASS INDEX: 37.8 KG/M2 | HEART RATE: 65 BPM | RESPIRATION RATE: 16 BRPM | HEIGHT: 70 IN | TEMPERATURE: 97.5 F | OXYGEN SATURATION: 96 % | WEIGHT: 264 LBS | SYSTOLIC BLOOD PRESSURE: 144 MMHG

## 2018-06-07 VITALS
WEIGHT: 264 LBS | HEIGHT: 70 IN | HEART RATE: 66 BPM | DIASTOLIC BLOOD PRESSURE: 70 MMHG | SYSTOLIC BLOOD PRESSURE: 140 MMHG | BODY MASS INDEX: 37.8 KG/M2

## 2018-06-07 DIAGNOSIS — R00.1 BRADYCARDIA: ICD-10-CM

## 2018-06-07 DIAGNOSIS — Z01.811 PRE-OP CHEST EXAM: ICD-10-CM

## 2018-06-07 DIAGNOSIS — I25.83 CORONARY ARTERY DISEASE DUE TO LIPID RICH PLAQUE: ICD-10-CM

## 2018-06-07 DIAGNOSIS — I25.10 CORONARY ARTERY DISEASE DUE TO LIPID RICH PLAQUE: ICD-10-CM

## 2018-06-07 DIAGNOSIS — I35.0 AORTIC STENOSIS, MODERATE: Primary | ICD-10-CM

## 2018-06-07 DIAGNOSIS — Z01.818 PREOP EXAMINATION: ICD-10-CM

## 2018-06-07 LAB
ABO/RH: NORMAL
ALBUMIN SERPL-MCNC: 4.3 G/DL (ref 3.4–5)
ALP BLD-CCNC: 98 U/L (ref 40–129)
ALT SERPL-CCNC: 18 U/L (ref 10–40)
ANION GAP SERPL CALCULATED.3IONS-SCNC: 16 MMOL/L (ref 3–16)
ANTIBODY SCREEN: NORMAL
AST SERPL-CCNC: 33 U/L (ref 15–37)
BASOPHILS ABSOLUTE: 0 K/UL (ref 0–0.2)
BASOPHILS RELATIVE PERCENT: 0.6 %
BILIRUB SERPL-MCNC: 0.5 MG/DL (ref 0–1)
BILIRUBIN DIRECT: <0.2 MG/DL (ref 0–0.3)
BILIRUBIN URINE: NEGATIVE
BILIRUBIN, INDIRECT: NORMAL MG/DL (ref 0–1)
BLOOD, URINE: NEGATIVE
BUN BLDV-MCNC: 24 MG/DL (ref 7–20)
CALCIUM SERPL-MCNC: 10.1 MG/DL (ref 8.3–10.6)
CHLORIDE BLD-SCNC: 94 MMOL/L (ref 99–110)
CLARITY: CLEAR
CO2: 27 MMOL/L (ref 21–32)
COLOR: YELLOW
CREAT SERPL-MCNC: 1.1 MG/DL (ref 0.8–1.3)
EOSINOPHILS ABSOLUTE: 0.4 K/UL (ref 0–0.6)
EOSINOPHILS RELATIVE PERCENT: 4.8 %
GFR AFRICAN AMERICAN: >60
GFR NON-AFRICAN AMERICAN: >60
GLUCOSE BLD-MCNC: 117 MG/DL (ref 70–99)
GLUCOSE URINE: NEGATIVE MG/DL
HCT VFR BLD CALC: 41.4 % (ref 40.5–52.5)
HEMOGLOBIN: 14.1 G/DL (ref 13.5–17.5)
INR BLD: 1 (ref 0.86–1.14)
KETONES, URINE: NEGATIVE MG/DL
LEUKOCYTE ESTERASE, URINE: NEGATIVE
LYMPHOCYTES ABSOLUTE: 3.8 K/UL (ref 1–5.1)
LYMPHOCYTES RELATIVE PERCENT: 51.6 %
MAGNESIUM: 2.5 MG/DL (ref 1.8–2.4)
MCH RBC QN AUTO: 30.1 PG (ref 26–34)
MCHC RBC AUTO-ENTMCNC: 34.1 G/DL (ref 31–36)
MCV RBC AUTO: 88.3 FL (ref 80–100)
MICROSCOPIC EXAMINATION: NORMAL
MONOCYTES ABSOLUTE: 0.7 K/UL (ref 0–1.3)
MONOCYTES RELATIVE PERCENT: 9.2 %
NEUTROPHILS ABSOLUTE: 2.5 K/UL (ref 1.7–7.7)
NEUTROPHILS RELATIVE PERCENT: 33.8 %
NITRITE, URINE: NEGATIVE
PDW BLD-RTO: 13.7 % (ref 12.4–15.4)
PH UA: 6.5
PLATELET # BLD: 206 K/UL (ref 135–450)
PMV BLD AUTO: 9.6 FL (ref 5–10.5)
POTASSIUM SERPL-SCNC: 3.9 MMOL/L (ref 3.5–5.1)
PRO-BNP: 1731 PG/ML (ref 0–449)
PROTEIN UA: NEGATIVE MG/DL
PROTHROMBIN TIME: 11.4 SEC (ref 9.8–13)
RBC # BLD: 4.69 M/UL (ref 4.2–5.9)
SODIUM BLD-SCNC: 137 MMOL/L (ref 136–145)
SPECIFIC GRAVITY UA: 1.01
TOTAL PROTEIN: 7.9 G/DL (ref 6.4–8.2)
TROPONIN: <0.01 NG/ML
URINE REFLEX TO CULTURE: NORMAL
URINE TYPE: NORMAL
UROBILINOGEN, URINE: 0.2 E.U./DL
WBC # BLD: 7.3 K/UL (ref 4–11)

## 2018-06-07 PROCEDURE — G8427 DOCREV CUR MEDS BY ELIG CLIN: HCPCS | Performed by: INTERNAL MEDICINE

## 2018-06-07 PROCEDURE — 1036F TOBACCO NON-USER: CPT | Performed by: INTERNAL MEDICINE

## 2018-06-07 PROCEDURE — 1123F ACP DISCUSS/DSCN MKR DOCD: CPT | Performed by: INTERNAL MEDICINE

## 2018-06-07 PROCEDURE — 4040F PNEUMOC VAC/ADMIN/RCVD: CPT | Performed by: INTERNAL MEDICINE

## 2018-06-07 PROCEDURE — G8598 ASA/ANTIPLAT THER USED: HCPCS | Performed by: INTERNAL MEDICINE

## 2018-06-07 PROCEDURE — G8417 CALC BMI ABV UP PARAM F/U: HCPCS | Performed by: INTERNAL MEDICINE

## 2018-06-07 PROCEDURE — 99214 OFFICE O/P EST MOD 30 MIN: CPT | Performed by: INTERNAL MEDICINE

## 2018-06-07 ASSESSMENT — ENCOUNTER SYMPTOMS: SHORTNESS OF BREATH: 1

## 2018-06-07 ASSESSMENT — COPD QUESTIONNAIRES: CAT_SEVERITY: MODERATE

## 2018-06-08 ENCOUNTER — TELEPHONE (OUTPATIENT)
Dept: CARDIOLOGY CLINIC | Age: 83
End: 2018-06-08

## 2018-06-11 RX ORDER — SODIUM CHLORIDE, SODIUM LACTATE, POTASSIUM CHLORIDE, CALCIUM CHLORIDE 600; 310; 30; 20 MG/100ML; MG/100ML; MG/100ML; MG/100ML
INJECTION, SOLUTION INTRAVENOUS CONTINUOUS
Status: CANCELLED | OUTPATIENT
Start: 2018-06-12

## 2018-06-11 RX ORDER — CEFAZOLIN SODIUM 2 G/100ML
2 INJECTION, SOLUTION INTRAVENOUS ONCE
Status: CANCELLED | OUTPATIENT
Start: 2018-06-12

## 2018-06-12 ENCOUNTER — HOSPITAL ENCOUNTER (OUTPATIENT)
Dept: OPERATING ROOM | Age: 83
Discharge: HOME | End: 2018-06-12
Attending: INTERNAL MEDICINE | Admitting: INTERNAL MEDICINE

## 2018-06-12 RX ORDER — NICOTINE POLACRILEX 4 MG
15 LOZENGE BUCCAL PRN
Status: DISCONTINUED | OUTPATIENT
Start: 2018-06-12 | End: 2018-06-13 | Stop reason: HOSPADM

## 2018-06-12 RX ORDER — DEXTROSE MONOHYDRATE 25 G/50ML
12.5 INJECTION, SOLUTION INTRAVENOUS PRN
Status: DISCONTINUED | OUTPATIENT
Start: 2018-06-12 | End: 2018-06-13 | Stop reason: HOSPADM

## 2018-06-12 RX ORDER — DEXTROSE MONOHYDRATE 50 MG/ML
100 INJECTION, SOLUTION INTRAVENOUS PRN
Status: DISCONTINUED | OUTPATIENT
Start: 2018-06-12 | End: 2018-06-13 | Stop reason: HOSPADM

## 2018-06-14 ENCOUNTER — TELEPHONE (OUTPATIENT)
Dept: CARDIOLOGY CLINIC | Age: 83
End: 2018-06-14

## 2018-06-14 RX ORDER — FAMOTIDINE 20 MG/1
TABLET, FILM COATED ORAL
Qty: 180 TABLET | Refills: 3 | Status: SHIPPED | OUTPATIENT
Start: 2018-06-14 | End: 2019-05-13 | Stop reason: ALTCHOICE

## 2018-06-14 RX ORDER — CLOPIDOGREL BISULFATE 75 MG/1
TABLET ORAL
Qty: 90 TABLET | Refills: 5 | Status: SHIPPED | OUTPATIENT
Start: 2018-06-14 | End: 2018-12-03 | Stop reason: SDUPTHER

## 2018-06-15 ENCOUNTER — TELEPHONE (OUTPATIENT)
Dept: CARDIOLOGY CLINIC | Age: 83
End: 2018-06-15

## 2018-06-18 ENCOUNTER — TELEPHONE (OUTPATIENT)
Dept: CARDIOLOGY CLINIC | Age: 83
End: 2018-06-18

## 2018-06-20 ENCOUNTER — TELEPHONE (OUTPATIENT)
Dept: CARDIOLOGY CLINIC | Age: 83
End: 2018-06-20

## 2018-06-22 ENCOUNTER — TELEPHONE (OUTPATIENT)
Dept: CARDIOLOGY CLINIC | Age: 83
End: 2018-06-22

## 2018-06-27 ENCOUNTER — OFFICE VISIT (OUTPATIENT)
Dept: CARDIOLOGY CLINIC | Age: 83
End: 2018-06-27

## 2018-06-27 VITALS
OXYGEN SATURATION: 98 % | HEART RATE: 60 BPM | BODY MASS INDEX: 37.22 KG/M2 | HEIGHT: 70 IN | WEIGHT: 260 LBS | SYSTOLIC BLOOD PRESSURE: 138 MMHG | DIASTOLIC BLOOD PRESSURE: 80 MMHG

## 2018-06-27 DIAGNOSIS — I35.0 NONRHEUMATIC AORTIC VALVE STENOSIS: ICD-10-CM

## 2018-06-27 DIAGNOSIS — R00.1 BRADYCARDIA: ICD-10-CM

## 2018-06-27 DIAGNOSIS — I35.0 AORTIC STENOSIS, MODERATE: Chronic | ICD-10-CM

## 2018-06-27 DIAGNOSIS — I34.0 NON-RHEUMATIC MITRAL REGURGITATION: ICD-10-CM

## 2018-06-27 DIAGNOSIS — I25.83 CORONARY ARTERY DISEASE DUE TO LIPID RICH PLAQUE: Chronic | ICD-10-CM

## 2018-06-27 DIAGNOSIS — I25.10 CORONARY ARTERY DISEASE DUE TO LIPID RICH PLAQUE: Chronic | ICD-10-CM

## 2018-06-27 DIAGNOSIS — I44.7 LBBB (LEFT BUNDLE BRANCH BLOCK): ICD-10-CM

## 2018-06-27 DIAGNOSIS — I51.7 LVH (LEFT VENTRICULAR HYPERTROPHY): ICD-10-CM

## 2018-06-27 DIAGNOSIS — Z95.3 STATUS POST TRANSCATHETER AORTIC VALVE REPLACEMENT (TAVR) USING BIOPROSTHESIS: Primary | ICD-10-CM

## 2018-06-27 PROCEDURE — 93000 ELECTROCARDIOGRAM COMPLETE: CPT | Performed by: INTERNAL MEDICINE

## 2018-06-27 PROCEDURE — 99214 OFFICE O/P EST MOD 30 MIN: CPT | Performed by: INTERNAL MEDICINE

## 2018-06-27 PROCEDURE — 4040F PNEUMOC VAC/ADMIN/RCVD: CPT | Performed by: INTERNAL MEDICINE

## 2018-06-27 PROCEDURE — G8417 CALC BMI ABV UP PARAM F/U: HCPCS | Performed by: INTERNAL MEDICINE

## 2018-06-27 PROCEDURE — 1111F DSCHRG MED/CURRENT MED MERGE: CPT | Performed by: INTERNAL MEDICINE

## 2018-06-27 PROCEDURE — G8598 ASA/ANTIPLAT THER USED: HCPCS | Performed by: INTERNAL MEDICINE

## 2018-06-27 PROCEDURE — G8427 DOCREV CUR MEDS BY ELIG CLIN: HCPCS | Performed by: INTERNAL MEDICINE

## 2018-06-27 PROCEDURE — 1036F TOBACCO NON-USER: CPT | Performed by: INTERNAL MEDICINE

## 2018-06-27 PROCEDURE — 1123F ACP DISCUSS/DSCN MKR DOCD: CPT | Performed by: INTERNAL MEDICINE

## 2018-07-02 ENCOUNTER — TELEPHONE (OUTPATIENT)
Dept: CARDIOLOGY CLINIC | Age: 83
End: 2018-07-02

## 2018-07-03 ENCOUNTER — NURSE ONLY (OUTPATIENT)
Dept: CARDIOLOGY CLINIC | Age: 83
End: 2018-07-03

## 2018-07-03 DIAGNOSIS — Z95.0 CARDIAC PACEMAKER: ICD-10-CM

## 2018-07-03 DIAGNOSIS — R00.1 BRADYCARDIA: ICD-10-CM

## 2018-07-03 PROCEDURE — 93294 REM INTERROG EVL PM/LDLS PM: CPT | Performed by: INTERNAL MEDICINE

## 2018-07-03 PROCEDURE — 93296 REM INTERROG EVL PM/IDS: CPT | Performed by: INTERNAL MEDICINE

## 2018-07-09 ENCOUNTER — TELEPHONE (OUTPATIENT)
Dept: CARDIOLOGY CLINIC | Age: 83
End: 2018-07-09

## 2018-07-10 NOTE — PROGRESS NOTES
Carelink transmission shows normal device function. Battery and lead function stable. See 32 secs VT. Will review with EP. No AF. See Paceart report under cardiology tab. Echo from June '18 shows LVEF .35-.40. Dr Loyd Smoker note from then states that pt is doing very well. He will follow up with [facility] 7/19/18. Recheck pacer remotely 3 mos.  mk

## 2018-07-16 ENCOUNTER — TELEPHONE (OUTPATIENT)
Dept: CARDIOLOGY CLINIC | Age: 83
End: 2018-07-16

## 2018-07-17 PROBLEM — I25.10 CORONARY ARTERY DISEASE INVOLVING NATIVE CORONARY ARTERY OF NATIVE HEART WITHOUT ANGINA PECTORIS: Status: ACTIVE | Noted: 2018-07-17

## 2018-07-17 PROBLEM — Z95.2 S/P TAVR (TRANSCATHETER AORTIC VALVE REPLACEMENT): Status: ACTIVE | Noted: 2018-07-17

## 2018-07-17 NOTE — PROGRESS NOTES
[address]       H+P // CONSULT // OUTPATIENT VISIT // [facility] VISIT     Referring Doctor Dee Padgett MD   Encounter Type Followup     CHIEF COMPLAINT     VisitType [] Acute [x] Chronic     Symptom [] None [] CP [] SOB [] Dizzy [] Palps [x] Fatigue     Problems AS, CAD, Bradycardia      HISTORY OF PRESENT ILLNESS     Doing very well. No cp, improved sob. Still fatigued. Symptom Y N Frequency Duration Severity Modifying Assoc Sx Other   CP [] [x]         SOB [] [x]         Dizzy [] [x]         Syncope [] [x]         Palpitations [] [x]           COMPLIANCE     Category Meds Diet Salt Exercise Tobacco Alcohol Drugs   Compliant [x] [] [] [] [x] [x] [x]   [x]Counseling given on all above above categories    HISTORY/ALLERGIES/ROS     MedHx:  has a past medical history of Acute combined systolic and diastolic congestive heart failure ([facility] [address] Allergic; Anemia; Anxiety; Aortic stenosis; Arthritis; BPH (benign prostatic hypertrophy); Bradycardia; CAD (coronary artery disease); Depression; Diabetes mellitus ([facility] [address] Erectile dysfunction; Hyperlipidemia; Hypertension; Nasal defect; Obesity; Prostate CA ([facility] [address] Prostate cancer ([facility] [address] SOB (shortness of breath) on exertion; and Unspecified sleep apnea. SurgHx:  has a past surgical history that includes hernia repair; Appendectomy; Total shoulder arthroplasty; Colonoscopy; Cholecystectomy, laparoscopic (12/30/2011); Percutaneous Transluminal Coronary Angio; joint replacement; Tympanostomy tube placement (466046); Cosmetic surgery; fracture surgery; and myringotomy (Left, 8/12/13). SocHx:  reports that he has never smoked. He has never used smokeless tobacco. He reports that he does not drink alcohol or use drugs. FamHx: family history includes Coronary Art Dis in an other family member; Early Death in his father; Heart Disease in his father. Allergies: Neosporin [neomycin-bacitracin zn-polymyx];  Sulfa antibiotics; and Codeine   ROS:  [x]Full ROS obtained and negative except as mentioned in HPI     MEDICATIONS      Current Outpatient Prescriptions   Medication Sig Dispense Refill    famotidine (PEPCID) 20 MG tablet TAKE 1 TABLET BY MOUTH TWICE DAILY 180 tablet 3    clopidogrel (PLAVIX) 75 MG tablet TAKE 1 TABLET BY MOUTH EVERY DAY 90 tablet 5    furosemide (LASIX) 40 MG tablet Take 1 tablet by mouth 2 times daily 120 tablet 6    fluticasone (FLONASE) 50 MCG/ACT nasal spray 1 spray by Nasal route daily 1 Bottle 1    budesonide-formoterol (SYMBICORT) 160-4.5 MCG/ACT AERO Inhale 2 puffs into the lungs 2 times daily      glipiZIDE (GLUCOTROL XL) 2.5 MG extended release tablet Take 2.5 mg by mouth daily      metoprolol tartrate (LOPRESSOR) 25 MG tablet Take 12.5 mg by mouth 2 times daily       potassium chloride (KLOR-CON) 10 MEQ extended release tablet Take 10 mEq by mouth 2 times daily      aspirin 81 MG tablet Take 81 mg by mouth daily      pravastatin (PRAVACHOL) 40 MG tablet TAKE ONE TABLET BY MOUTH EVERY DAY AT BEDTIME 90 tablet 3    amLODIPine (NORVASC) 10 MG tablet TAKE ONE TABLET BY MOUTH EVERY DAY 90 tablet 1    gemfibrozil (LOPID) 600 MG tablet TAKE ONE TABLET BY MOUTH TWICE DAILY 180 tablet 2     No current facility-administered medications for this visit.       Reviewed with patient and will remain unchanged except as mentioned in A/P  PHYSICAL EXAM     Vitals:    07/19/18 1442   BP: (!) 150/72   Pulse:       Gen Alert, coop, no distress Heart  RRR, no MRG, nl apical impulse   Head NC, AT, no abnorm Abd  Soft, NT, +BS, no mass, no OM   Eyes PER, conj/corn clear Ext  Ext nl, AT, no C/C/E   Nose Nares nl, no drain, NT Pulse 2+ and symmetric   Throat Lips, mucosa, tongue nl Skin Col/text/turg nl, no vis rash/les   Neck S/S, TM, NT, no bruit/JVD Psych Nl mood and affect   Lung CTA-B, unlabored, no DTP Lymph   No cervical or axillary LA   Ch wall NT, no deform Neuro  Nl gross M/S exam     ASSESSMENT AND PLAN     ~AS   Date EF Detail   Sx   No

## 2018-07-18 ENCOUNTER — HOSPITAL ENCOUNTER (OUTPATIENT)
Dept: NON INVASIVE DIAGNOSTICS | Age: 83
Discharge: HOME | End: 2018-07-18
Attending: INTERNAL MEDICINE | Admitting: INTERNAL MEDICINE

## 2018-07-18 DIAGNOSIS — Z95.3 STATUS POST TRANSCATHETER AORTIC VALVE REPLACEMENT (TAVR) USING BIOPROSTHESIS: ICD-10-CM

## 2018-07-18 DIAGNOSIS — I35.0 NONRHEUMATIC AORTIC VALVE STENOSIS: ICD-10-CM

## 2018-07-18 LAB
LEFT VENTRICULAR EJECTION FRACTION HIGH VALUE: 40 %
LEFT VENTRICULAR EJECTION FRACTION MODE: NORMAL
LV EF: 35 %
LV EF: 38 %
LVEF MODALITY: NORMAL

## 2018-07-19 ENCOUNTER — OFFICE VISIT (OUTPATIENT)
Dept: CARDIOLOGY CLINIC | Age: 83
End: 2018-07-19

## 2018-07-19 VITALS
WEIGHT: 261 LBS | SYSTOLIC BLOOD PRESSURE: 150 MMHG | DIASTOLIC BLOOD PRESSURE: 72 MMHG | HEART RATE: 88 BPM | BODY MASS INDEX: 37.37 KG/M2 | HEIGHT: 70 IN

## 2018-07-19 DIAGNOSIS — I25.10 CORONARY ARTERY DISEASE INVOLVING NATIVE CORONARY ARTERY OF NATIVE HEART WITHOUT ANGINA PECTORIS: ICD-10-CM

## 2018-07-19 DIAGNOSIS — R00.1 BRADYCARDIA: ICD-10-CM

## 2018-07-19 DIAGNOSIS — I35.0 NONRHEUMATIC AORTIC VALVE STENOSIS: Primary | ICD-10-CM

## 2018-07-19 DIAGNOSIS — Z95.2 S/P TAVR (TRANSCATHETER AORTIC VALVE REPLACEMENT): ICD-10-CM

## 2018-07-19 PROCEDURE — 1101F PT FALLS ASSESS-DOCD LE1/YR: CPT | Performed by: INTERNAL MEDICINE

## 2018-07-19 PROCEDURE — 1123F ACP DISCUSS/DSCN MKR DOCD: CPT | Performed by: INTERNAL MEDICINE

## 2018-07-19 PROCEDURE — G8598 ASA/ANTIPLAT THER USED: HCPCS | Performed by: INTERNAL MEDICINE

## 2018-07-19 PROCEDURE — G8417 CALC BMI ABV UP PARAM F/U: HCPCS | Performed by: INTERNAL MEDICINE

## 2018-07-19 PROCEDURE — 99214 OFFICE O/P EST MOD 30 MIN: CPT | Performed by: INTERNAL MEDICINE

## 2018-07-19 PROCEDURE — 4040F PNEUMOC VAC/ADMIN/RCVD: CPT | Performed by: INTERNAL MEDICINE

## 2018-07-19 PROCEDURE — 1036F TOBACCO NON-USER: CPT | Performed by: INTERNAL MEDICINE

## 2018-07-19 PROCEDURE — G8427 DOCREV CUR MEDS BY ELIG CLIN: HCPCS | Performed by: INTERNAL MEDICINE

## 2018-07-19 NOTE — LETTER
Head NC, AT, no abnorm Abd  Soft, NT, +BS, no mass, no OM   Eyes PER, conj/corn clear Ext  Ext nl, AT, no C/C/E   Nose Nares nl, no drain, NT Pulse 2+ and symmetric   Throat Lips, mucosa, tongue nl Skin Col/text/turg nl, no vis rash/les   Neck S/S, TM, NT, no bruit/JVD Psych Nl mood and affect   Lung CTA-B, unlabored, no DTP Lymph   No cervical or axillary LA   Ch wall NT, no deform Neuro  Nl gross M/S exam     ASSESSMENT AND PLAN     ~AS   Date EF Detail   Sx   No concerning   Hx 6/18  TAVR 29 mm ES3   NYHA   []I         [x]II           []III          []IV   TTE 4/12 6/16 12/17 6/18 7/18 65%  55%  40%  40%  40% Mild AS, MG 14  Mod AS, MG 24  Mod to Sev AS, MG 37  TAVR well seated, MG 9  TAVR well seated MG 12mmHg   STTE 5/18 40% Sev AS MG 46   Plan   Doing well. No concerning sx   ~CAD   Date EF Results   Sx   SOB   [facility] 2006 5/18    PTCA of ?  50% mid LAD, 50% mid Cx, 50% mid RCA   MPI 12/17 35% Fixed inferior apical defect, no ischemia   Plan   Continue aggressive medical treatment at doses above   ~Bradycardia   s/p Pacemaker 6/16   VT noted on pacemaker was intraoperative TAVR and correlates with rapid pacing  No indication for VT workup/treatment  ~Followup  []2 wk   []1m   [x]3m    []6m   []12m    []PRN  []Other:     Scribe Attestation:  I, Angie Farr, am scribing for and in the presence of Kerri Reddy MD.   Signed, Angie Farr 07/17/18 8:31 AM   Provider Arnie Mccullough is working as a scribe for and in the presence of me (Kerri Reddy MD). Working as a scribe, Angie Farr may have prepopulated components of this note with my historical  intellectual property under my direct supervision. Any additions to this intellectual property were performed in my presence and at my direction. Furthermore, the content and accuracy of this note have been reviewed by me Kerri Reddy MD). If you have questions, please do not hesitate to call me.  I look forward

## 2018-07-20 NOTE — COMMUNICATION BODY
[address] [facility] [address]       H+P // CONSULT // OUTPATIENT VISIT // [facility] VISIT     Referring Doctor Ashu Braun MD   Encounter Type Followup     CHIEF COMPLAINT     VisitType [] Acute [x] Chronic     Symptom [] None [] CP [] SOB [] Dizzy [] Palps [x] Fatigue     Problems AS, CAD, Bradycardia      HISTORY OF PRESENT ILLNESS     Doing very well. No cp, improved sob. Still fatigued. Symptom Y N Frequency Duration Severity Modifying Assoc Sx Other   CP [] [x]         SOB [] [x]         Dizzy [] [x]         Syncope [] [x]         Palpitations [] [x]           COMPLIANCE     Category Meds Diet Salt Exercise Tobacco Alcohol Drugs   Compliant [x] [] [] [] [x] [x] [x]   [x]Counseling given on all above above categories    HISTORY/ALLERGIES/ROS     MedHx:  has a past medical history of Acute combined systolic and diastolic congestive heart failure ([facility] [address] Allergic; Anemia; Anxiety; Aortic stenosis; Arthritis; BPH (benign prostatic hypertrophy); Bradycardia; CAD (coronary artery disease); Depression; Diabetes mellitus ([address] Erectile dysfunction; Hyperlipidemia; Hypertension; Nasal defect; Obesity; Prostate CA ([facility] [address] Prostate cancer ([facility] [address] SOB (shortness of breath) on exertion; and Unspecified sleep apnea. SurgHx:  has a past surgical history that includes hernia repair; Appendectomy; Total shoulder arthroplasty; Colonoscopy; Cholecystectomy, laparoscopic (12/30/2011); Percutaneous Transluminal Coronary Angio; joint replacement; Tympanostomy tube placement (245673); Cosmetic surgery; fracture surgery; and myringotomy (Left, 8/12/13). SocHx:  reports that he has never smoked. He has never used smokeless tobacco. He reports that he does not drink alcohol or use drugs. FamHx: family history includes Coronary Art Dis in an other family member; Early Death in his father; Heart Disease in his father. Allergies: Neosporin [neomycin-bacitracin zn-polymyx];  Sulfa antibiotics; and Codeine   ROS:  [x]Full ROS concerning   Hx 6/18  TAVR 29 mm ES3   NYHA   []I         [x]II           []III          []IV   TTE 4/12 6/16 12/17 6/18 7/18 65%  55%  40%  40%  40% Mild AS, MG 14  Mod AS, MG 24  Mod to Sev AS, MG 37  TAVR well seated, MG 9  TAVR well seated MG 12mmHg   STTE 5/18 40% Sev AS MG 46   Plan   Doing well. No concerning sx   ~CAD   Date EF Results   Sx   SOB   [facility] 2006 5/18    PTCA of ?  50% mid LAD, 50% mid Cx, 50% mid RCA   MPI 12/17 35% Fixed inferior apical defect, no ischemia   Plan   Continue aggressive medical treatment at doses above   ~Bradycardia   s/p Pacemaker 6/16   VT noted on pacemaker was intraoperative TAVR and correlates with rapid pacing  No indication for VT workup/treatment  ~Followup  []2 wk   []1m   [x]3m    []6m   []12m    []PRN  []Other:     Scribe Attestation:  IJenni, am scribing for and in the presence of Nancy Marquez MD.   SignedJenni 07/17/18 8:31 AM   Provider Kwame Nino is working as a scribe for and in the presence of me (Nancy Marquez MD). Working as a scribe, Jenni Martines may have prepopulated components of this note with my historical  intellectual property under my direct supervision. Any additions to this intellectual property were performed in my presence and at my direction. Furthermore, the content and accuracy of this note have been reviewed by me Nancy Marquez MD).

## 2018-07-27 ENCOUNTER — OFFICE VISIT (OUTPATIENT)
Dept: ENT CLINIC | Age: 83
End: 2018-07-27

## 2018-07-27 VITALS
WEIGHT: 267 LBS | DIASTOLIC BLOOD PRESSURE: 84 MMHG | BODY MASS INDEX: 38.22 KG/M2 | OXYGEN SATURATION: 95 % | HEART RATE: 64 BPM | SYSTOLIC BLOOD PRESSURE: 130 MMHG | HEIGHT: 70 IN

## 2018-07-27 DIAGNOSIS — J30.1 ACUTE SEASONAL ALLERGIC RHINITIS DUE TO POLLEN: Primary | ICD-10-CM

## 2018-07-27 PROCEDURE — 1036F TOBACCO NON-USER: CPT | Performed by: OTOLARYNGOLOGY

## 2018-07-27 PROCEDURE — G8427 DOCREV CUR MEDS BY ELIG CLIN: HCPCS | Performed by: OTOLARYNGOLOGY

## 2018-07-27 PROCEDURE — 99213 OFFICE O/P EST LOW 20 MIN: CPT | Performed by: OTOLARYNGOLOGY

## 2018-07-27 PROCEDURE — 1101F PT FALLS ASSESS-DOCD LE1/YR: CPT | Performed by: OTOLARYNGOLOGY

## 2018-07-27 PROCEDURE — 4040F PNEUMOC VAC/ADMIN/RCVD: CPT | Performed by: OTOLARYNGOLOGY

## 2018-07-27 PROCEDURE — G8417 CALC BMI ABV UP PARAM F/U: HCPCS | Performed by: OTOLARYNGOLOGY

## 2018-07-27 PROCEDURE — G8598 ASA/ANTIPLAT THER USED: HCPCS | Performed by: OTOLARYNGOLOGY

## 2018-07-27 PROCEDURE — 1123F ACP DISCUSS/DSCN MKR DOCD: CPT | Performed by: OTOLARYNGOLOGY

## 2018-07-27 RX ORDER — AZELASTINE 1 MG/ML
2 SPRAY, METERED NASAL 2 TIMES DAILY
Qty: 1 BOTTLE | Refills: 1 | Status: SHIPPED | OUTPATIENT
Start: 2018-07-27 | End: 2019-10-14

## 2018-07-27 NOTE — PROGRESS NOTES
clear. There is no polyp, ulceration, or masses visualized either naris. The septum is not deviated or deflected to a significant degree. The mirror exam of the nasopharynx shows no mucosal disease or obstruction. Cerebellar testing was normal for rapid alternating movements, past pointing, and position defense  Cranial nerves are intact  Chest reveals normal effort, no stridor. No tachypnea. No respiratory distress    Impression:  No subjective or objective findings to suggest the labyrinth as the cause of his disequilibrium.   Cardiovascular and/or metabolic issues are more likely  Allergic rhinitis with no suggestion of secondary sinusitis or otitis    Plan:  Advised to consult with his cardiologist regarding his disequilibrium  Began Astelin 2 puffs daily  Return the office in 10-14 days

## 2018-08-07 ENCOUNTER — HOSPITAL ENCOUNTER (OUTPATIENT)
Dept: CARDIAC REHAB | Age: 83
Discharge: HOME | End: 2018-08-31
Attending: FAMILY MEDICINE | Admitting: INTERNAL MEDICINE

## 2018-08-07 DIAGNOSIS — I35.0 NONRHEUMATIC AORTIC VALVE STENOSIS: ICD-10-CM

## 2018-08-16 ENCOUNTER — TELEPHONE (OUTPATIENT)
Dept: CARDIOLOGY CLINIC | Age: 83
End: 2018-08-16

## 2018-08-16 NOTE — TELEPHONE ENCOUNTER
Received a call from BLAIR Terrazas in cardiac rehab, that pt weight is up and he has bilateral rales. Spoke to pt and he admits to increased SOB, weight gain. Compliant with Lasix 40mg BID. Instructed him to increase to 80mg BID today and tomorrow. Verbalized understanding. Dr. Chika Brown aware.  Nick PINTO

## 2018-08-17 ENCOUNTER — TELEPHONE (OUTPATIENT)
Dept: CARDIOLOGY CLINIC | Age: 83
End: 2018-08-17

## 2018-08-17 NOTE — TELEPHONE ENCOUNTER
Spoke to pt. States that he is feeling much better, no SOB after increasing his lasix to 80mg BID yesterday and today. He is not sure of his weight but will check it today. Instructed him to take his 2nd dose of lasix 80mg this afternoon along with kcl 20Meq and go back to his normal dose of lasix 40mg BID and KCL 10mEq BID in the morning. Instructed to call me with any questions/concerns. Dr. Myrna hodges.  Nick RN

## 2018-08-28 LAB
GLUCOSE BLD-MCNC: 127 MG/DL (ref 70–99)
GLUCOSE BLD-MCNC: 195 MG/DL (ref 70–99)
PERFORMED ON: ABNORMAL
PERFORMED ON: ABNORMAL

## 2018-08-29 ENCOUNTER — TELEPHONE (OUTPATIENT)
Dept: CARDIOLOGY CLINIC | Age: 83
End: 2018-08-29

## 2018-08-29 DIAGNOSIS — R06.02 SOB (SHORTNESS OF BREATH): Primary | ICD-10-CM

## 2018-08-29 NOTE — TELEPHONE ENCOUNTER
Pt called stating that he is feeling very fatigued. States that this has been ongoing for a few days. Denies SOB. States since doubling up on Lasix yesterday and today, he feels better. States that his stools have been \"black as tar\" for the past 3 days but today's was back to normal. States that he told his PCP about this yesterday. Discussed with Dr. Shimon Noguera. Will have him check a CBC,BMP tomorrow (8/30/18). He is planning on attending [facility] as scheduled tomorrow.  Nick PINTO

## 2018-08-30 ENCOUNTER — HOSPITAL ENCOUNTER (OUTPATIENT)
Dept: OTHER | Age: 83
Discharge: HOME | End: 2018-08-30
Attending: INTERNAL MEDICINE | Admitting: INTERNAL MEDICINE

## 2018-08-30 DIAGNOSIS — R06.02 SOB (SHORTNESS OF BREATH): ICD-10-CM

## 2018-08-30 LAB
ANION GAP SERPL CALCULATED.3IONS-SCNC: 18 MMOL/L (ref 3–16)
BUN BLDV-MCNC: 30 MG/DL (ref 7–20)
CALCIUM SERPL-MCNC: 10 MG/DL (ref 8.3–10.6)
CHLORIDE BLD-SCNC: 93 MMOL/L (ref 99–110)
CO2: 24 MMOL/L (ref 21–32)
CREAT SERPL-MCNC: 1.3 MG/DL (ref 0.8–1.3)
GFR AFRICAN AMERICAN: >60
GFR NON-AFRICAN AMERICAN: 52
GLUCOSE BLD-MCNC: 84 MG/DL (ref 70–99)
HCT VFR BLD CALC: 39.3 % (ref 40.5–52.5)
HEMOGLOBIN: 13.6 G/DL (ref 13.5–17.5)
MCH RBC QN AUTO: 30.5 PG (ref 26–34)
MCHC RBC AUTO-ENTMCNC: 34.6 G/DL (ref 31–36)
MCV RBC AUTO: 88.1 FL (ref 80–100)
PDW BLD-RTO: 13.6 % (ref 12.4–15.4)
PLATELET # BLD: 216 K/UL (ref 135–450)
PMV BLD AUTO: 9.4 FL (ref 5–10.5)
POTASSIUM SERPL-SCNC: 4.1 MMOL/L (ref 3.5–5.1)
RBC # BLD: 4.46 M/UL (ref 4.2–5.9)
SODIUM BLD-SCNC: 135 MMOL/L (ref 136–145)
WBC # BLD: 8 K/UL (ref 4–11)

## 2018-09-01 ENCOUNTER — HOSPITAL ENCOUNTER (OUTPATIENT)
Dept: OTHER | Age: 83
Discharge: HOME | End: 2018-09-01
Attending: INTERNAL MEDICINE | Admitting: INTERNAL MEDICINE

## 2018-09-04 ENCOUNTER — TELEPHONE (OUTPATIENT)
Dept: CARDIOLOGY CLINIC | Age: 83
End: 2018-09-04

## 2018-09-19 ENCOUNTER — TELEPHONE (OUTPATIENT)
Dept: CARDIOLOGY CLINIC | Age: 83
End: 2018-09-19

## 2018-10-01 ENCOUNTER — OFFICE VISIT (OUTPATIENT)
Dept: ENT CLINIC | Age: 83
End: 2018-10-01
Payer: MEDICARE

## 2018-10-01 VITALS
DIASTOLIC BLOOD PRESSURE: 78 MMHG | SYSTOLIC BLOOD PRESSURE: 140 MMHG | HEART RATE: 61 BPM | WEIGHT: 267 LBS | BODY MASS INDEX: 38.31 KG/M2 | OXYGEN SATURATION: 94 %

## 2018-10-01 DIAGNOSIS — H61.22 CERUMEN DEBRIS ON TYMPANIC MEMBRANE OF LEFT EAR: Primary | ICD-10-CM

## 2018-10-01 PROCEDURE — 69210 REMOVE IMPACTED EAR WAX UNI: CPT | Performed by: OTOLARYNGOLOGY

## 2018-10-01 NOTE — PROGRESS NOTES
The patient was concerned about findings elsewhere for a left-sided cerumen impaction. This correlated with a feeling of fullness in the left ear for the past several weeks. The right ear was asymptomatic  He has not been using any drops or manipulating the ear           I was unable to visualize  left tympanic membrane  due to excessive cerumen and debris in the ear canal. The ear was not sensitive to touch. There was not raman auricular involvement. The left ear canal was cleaned of cerumen, keratinaceous and squamous debis by curettage, suction/irrigation. This was carried out under binocular microscopy given the proximity to the tympanic membrane. The patient tolerated this well. Following removal, the external auditory canal was normal. The tympanic membrane was intact and there was good aeration of the middle ear space. Immediate subjective relief noted after removal.     IMPRESSION: Cerumen impaction, left, resolved after cleaning    PLAN: Long term care of the ear canal was discussed. Earwax suggestions   1. Do not allow Q- tips to enter your ear canal   2. Place 2 or 3 drops of rubbing alcohol in each ear monthly. 3. Allow drops to stay in for a minute per side, then use cotton ball to \"mop up. \"

## 2018-10-02 ENCOUNTER — HOSPITAL ENCOUNTER (OUTPATIENT)
Dept: CARDIAC REHAB | Age: 83
Setting detail: THERAPIES SERIES
Discharge: HOME | End: 2018-10-02
Payer: MEDICARE

## 2018-10-02 ENCOUNTER — NURSE ONLY (OUTPATIENT)
Dept: CARDIOLOGY CLINIC | Age: 83
End: 2018-10-02
Payer: MEDICARE

## 2018-10-02 DIAGNOSIS — R00.1 BRADYCARDIA: ICD-10-CM

## 2018-10-02 DIAGNOSIS — Z95.0 PACEMAKER: ICD-10-CM

## 2018-10-02 PROCEDURE — 93798 PHYS/QHP OP CAR RHAB W/ECG: CPT

## 2018-10-03 ENCOUNTER — TELEPHONE (OUTPATIENT)
Dept: CARDIOLOGY CLINIC | Age: 83
End: 2018-10-03

## 2018-10-03 PROCEDURE — 93296 REM INTERROG EVL PM/IDS: CPT | Performed by: INTERNAL MEDICINE

## 2018-10-03 PROCEDURE — 93294 REM INTERROG EVL PM/LDLS PM: CPT | Performed by: INTERNAL MEDICINE

## 2018-10-04 ENCOUNTER — TELEPHONE (OUTPATIENT)
Dept: CARDIOLOGY CLINIC | Age: 83
End: 2018-10-04

## 2018-10-04 ENCOUNTER — HOSPITAL ENCOUNTER (OUTPATIENT)
Dept: CARDIAC REHAB | Age: 83
Setting detail: THERAPIES SERIES
End: 2018-10-04
Payer: MEDICARE

## 2018-10-04 NOTE — TELEPHONE ENCOUNTER
Called pt. He states that his chest muscles are sore from working out. States that he is SOB but no different than previous. He also complaints of diarrhea today. He is taking his Lasix 40mg BID and is watching his salt intake. States that he has lost 17 pounds over the past 2 weeks. Instructed him to continue his BID Lasix and cardiac rehab. He is also to call his PCP about the diarrhea if it persists. Verbalized understanding.  Nick PINTO

## 2018-10-04 NOTE — TELEPHONE ENCOUNTER
Last ov 7/18 DCE    AS    Date EF Detail   Sx     No concerning   Hx 6/18   TAVR 29 mm ES3   NYHA     []I         [x]II           []III          []IV   TTE 4/12 6/16 12/17 6/18 7/18 65%  55%  40%  40%  40% Mild AS, MG 14  Mod AS, MG 24  Mod to Sev AS, MG 37  TAVR well seated, MG 9  TAVR well seated MG 12mmHg   STTE 5/18 40% Sev AS MG 46   Plan     Doing well.   No concerning sx   ~CAD    Date EF Results   Sx     SOB   [facility] 2006 5/18      PTCA of ?  50% mid LAD, 50% mid Cx, 50% mid RCA   MPI 12/17 35% Fixed inferior apical defect, no ischemia   Plan     Continue aggressive medical treatment at doses above   ~Bradycardia   s/p Pacemaker 6/16   VT noted on pacemaker was intraoperative TAVR and correlates with rapid pacing  No indication for VT workup/treatment  ~Followup  []2 wk   []1m   [x]3m

## 2018-10-05 NOTE — PROGRESS NOTES
Carelink transmission shows normal device function. Battery and lead function stable. No VT or AT/AF. See Paceart report under cardiology tab. Recheck 3 mos.  mk

## 2018-10-09 ENCOUNTER — HOSPITAL ENCOUNTER (OUTPATIENT)
Dept: CARDIAC REHAB | Age: 83
Setting detail: THERAPIES SERIES
End: 2018-10-09
Payer: MEDICARE

## 2018-10-11 ENCOUNTER — HOSPITAL ENCOUNTER (OUTPATIENT)
Dept: CARDIAC REHAB | Age: 83
Setting detail: THERAPIES SERIES
End: 2018-10-11
Payer: MEDICARE

## 2018-10-16 ENCOUNTER — HOSPITAL ENCOUNTER (OUTPATIENT)
Dept: CARDIAC REHAB | Age: 83
Setting detail: THERAPIES SERIES
End: 2018-10-16
Payer: MEDICARE

## 2018-10-18 ENCOUNTER — HOSPITAL ENCOUNTER (OUTPATIENT)
Dept: CARDIAC REHAB | Age: 83
Setting detail: THERAPIES SERIES
End: 2018-10-18
Payer: MEDICARE

## 2018-10-22 ENCOUNTER — OFFICE VISIT (OUTPATIENT)
Dept: ENT CLINIC | Age: 83
End: 2018-10-22
Payer: MEDICARE

## 2018-10-22 VITALS — SYSTOLIC BLOOD PRESSURE: 144 MMHG | HEART RATE: 60 BPM | OXYGEN SATURATION: 95 % | DIASTOLIC BLOOD PRESSURE: 80 MMHG

## 2018-10-22 DIAGNOSIS — H92.12 OTORRHEA OF LEFT EAR: Primary | ICD-10-CM

## 2018-10-22 PROCEDURE — 4040F PNEUMOC VAC/ADMIN/RCVD: CPT | Performed by: OTOLARYNGOLOGY

## 2018-10-22 PROCEDURE — G8484 FLU IMMUNIZE NO ADMIN: HCPCS | Performed by: OTOLARYNGOLOGY

## 2018-10-22 PROCEDURE — 99212 OFFICE O/P EST SF 10 MIN: CPT | Performed by: OTOLARYNGOLOGY

## 2018-10-22 PROCEDURE — 1123F ACP DISCUSS/DSCN MKR DOCD: CPT | Performed by: OTOLARYNGOLOGY

## 2018-10-22 PROCEDURE — 1036F TOBACCO NON-USER: CPT | Performed by: OTOLARYNGOLOGY

## 2018-10-22 PROCEDURE — G8417 CALC BMI ABV UP PARAM F/U: HCPCS | Performed by: OTOLARYNGOLOGY

## 2018-10-22 PROCEDURE — 1101F PT FALLS ASSESS-DOCD LE1/YR: CPT | Performed by: OTOLARYNGOLOGY

## 2018-10-22 PROCEDURE — G8427 DOCREV CUR MEDS BY ELIG CLIN: HCPCS | Performed by: OTOLARYNGOLOGY

## 2018-10-22 PROCEDURE — G8598 ASA/ANTIPLAT THER USED: HCPCS | Performed by: OTOLARYNGOLOGY

## 2018-10-22 RX ORDER — CEFDINIR 300 MG/1
300 CAPSULE ORAL 2 TIMES DAILY
Qty: 12 CAPSULE | Refills: 0 | Status: SHIPPED | OUTPATIENT
Start: 2018-10-22 | End: 2018-12-21 | Stop reason: ALTCHOICE

## 2018-10-23 ENCOUNTER — HOSPITAL ENCOUNTER (OUTPATIENT)
Dept: CARDIAC REHAB | Age: 83
Setting detail: THERAPIES SERIES
Discharge: HOME | End: 2018-10-23
Payer: MEDICARE

## 2018-10-23 ENCOUNTER — TELEPHONE (OUTPATIENT)
Dept: ENT CLINIC | Age: 83
End: 2018-10-23

## 2018-10-23 PROCEDURE — 93798 PHYS/QHP OP CAR RHAB W/ECG: CPT

## 2018-10-24 DIAGNOSIS — H92.12 OTORRHEA OF LEFT EAR: Primary | ICD-10-CM

## 2018-10-24 RX ORDER — SULFACETAMIDE SODIUM AND PREDNISOLONE SODIUM PHOSPHATE 100; 2.3 MG/ML; MG/ML
SOLUTION/ DROPS OPHTHALMIC
Qty: 10 ML | Refills: 0 | Status: SHIPPED | OUTPATIENT
Start: 2018-10-24 | End: 2018-10-26 | Stop reason: ALTCHOICE

## 2018-10-25 ENCOUNTER — HOSPITAL ENCOUNTER (OUTPATIENT)
Dept: CARDIAC REHAB | Age: 83
Setting detail: THERAPIES SERIES
Discharge: HOME | End: 2018-10-25
Payer: MEDICARE

## 2018-10-25 PROCEDURE — 93798 PHYS/QHP OP CAR RHAB W/ECG: CPT

## 2018-10-26 DIAGNOSIS — H92.12 OTORRHEA OF LEFT EAR: Primary | ICD-10-CM

## 2018-10-26 RX ORDER — NEOMYCIN SULFATE, POLYMYXIN B SULFATE AND HYDROCORTISONE 10; 3.5; 1 MG/ML; MG/ML; [USP'U]/ML
3 SUSPENSION/ DROPS AURICULAR (OTIC) 3 TIMES DAILY
Qty: 10 ML | Refills: 0 | Status: SHIPPED | OUTPATIENT
Start: 2018-10-26 | End: 2018-10-28

## 2018-10-26 NOTE — TELEPHONE ENCOUNTER
Anabella is questioning if it will be ok for pt to take medication Vasocidin due to it containing Sulfa and pt has Sulfa listed as an Allergy but more Nausea side effects? Want to know if  you can RX med: Cortisporin as an alternative? Please advise.

## 2018-10-30 ENCOUNTER — TELEPHONE (OUTPATIENT)
Dept: CARDIOLOGY CLINIC | Age: 83
End: 2018-10-30

## 2018-10-30 ENCOUNTER — HOSPITAL ENCOUNTER (OUTPATIENT)
Dept: CARDIAC REHAB | Age: 83
Setting detail: THERAPIES SERIES
End: 2018-10-30
Payer: MEDICARE

## 2018-11-05 NOTE — PATIENT INSTRUCTIONS
Cut your portions in half to help lose weight    Try to work on keeping active and watching your salt intake    Your goal weight is 80 pounds less than what you weigh today    Go back to cardiac rehab!!

## 2018-11-05 NOTE — PROGRESS NOTES
[address]       H+P // CONSULT // OUTPATIENT VISIT // [place] [facility] VISIT     Referring Doctor Gil Hankins MD   Encounter Type Followup     CHIEF COMPLAINT     VisitType [] Acute [x] Chronic     Symptom [] None [] CP [] SOB [] Dizzy [] Palps [x] Fatigue     Problems AS s/p TAVR, CAD, Bradycardia      HISTORY OF PRESENT ILLNESS     Doing very well. No cp, improved sob. Still fatigued. Weight remains 270. Symptom Y N Frequency Duration Severity Modifying Assoc Sx Other   CP [] [x]         SOB [] [x]         Dizzy [] [x]         Syncope [] [x]         Palpitations [] [x]           COMPLIANCE     Category Meds Diet Salt Exercise Tobacco Alcohol Drugs   Compliant [x] [] [] [] [x] [x] [x]   [x]Counseling given on all above above categories    HISTORY/ALLERGIES/ROS     MedHx:  has a past medical history of Acute combined systolic and diastolic congestive heart failure ([address] Allergic; Anemia; Anxiety; Aortic stenosis; Arthritis; BPH (benign prostatic hypertrophy); Bradycardia; CAD (coronary artery disease); Depression; Diabetes mellitus ([address] Erectile dysfunction; Hyperlipidemia; Hypertension; Nasal defect; Obesity; Prostate CA ([address] Prostate cancer ([address] SOB (shortness of breath) on exertion; and Unspecified sleep apnea. SurgHx:  has a past surgical history that includes hernia repair; Appendectomy; Total shoulder arthroplasty; Colonoscopy; Cholecystectomy, laparoscopic (12/30/2011); Percutaneous Transluminal Coronary Angio; joint replacement; Tympanostomy tube placement (254312); Cosmetic surgery; fracture surgery; and myringotomy (Left, 8/12/13). SocHx:  reports that he has never smoked. He has never used smokeless tobacco. He reports that he does not drink alcohol or use drugs. FamHx: family history includes Coronary Art Dis in an other family member; Early Death in his father; Heart Disease in his father. Allergies: Neosporin [neomycin-bacitracin zn-polymyx];  Sulfa antibiotics;

## 2018-11-08 ENCOUNTER — OFFICE VISIT (OUTPATIENT)
Dept: CARDIOLOGY CLINIC | Age: 83
End: 2018-11-08
Payer: MEDICARE

## 2018-11-08 VITALS
BODY MASS INDEX: 38.65 KG/M2 | HEART RATE: 58 BPM | SYSTOLIC BLOOD PRESSURE: 130 MMHG | DIASTOLIC BLOOD PRESSURE: 60 MMHG | HEIGHT: 70 IN | WEIGHT: 270 LBS

## 2018-11-08 DIAGNOSIS — R00.1 BRADYCARDIA: ICD-10-CM

## 2018-11-08 DIAGNOSIS — I35.0 NONRHEUMATIC AORTIC VALVE STENOSIS: Primary | ICD-10-CM

## 2018-11-08 DIAGNOSIS — Z95.2 S/P TAVR (TRANSCATHETER AORTIC VALVE REPLACEMENT): ICD-10-CM

## 2018-11-08 DIAGNOSIS — Z95.0 PACEMAKER: ICD-10-CM

## 2018-11-08 DIAGNOSIS — I25.10 CORONARY ARTERY DISEASE INVOLVING NATIVE CORONARY ARTERY OF NATIVE HEART WITHOUT ANGINA PECTORIS: ICD-10-CM

## 2018-11-08 PROCEDURE — 1123F ACP DISCUSS/DSCN MKR DOCD: CPT | Performed by: INTERNAL MEDICINE

## 2018-11-08 PROCEDURE — G8598 ASA/ANTIPLAT THER USED: HCPCS | Performed by: INTERNAL MEDICINE

## 2018-11-08 PROCEDURE — 4040F PNEUMOC VAC/ADMIN/RCVD: CPT | Performed by: INTERNAL MEDICINE

## 2018-11-08 PROCEDURE — G8484 FLU IMMUNIZE NO ADMIN: HCPCS | Performed by: INTERNAL MEDICINE

## 2018-11-08 PROCEDURE — G8417 CALC BMI ABV UP PARAM F/U: HCPCS | Performed by: INTERNAL MEDICINE

## 2018-11-08 PROCEDURE — 1101F PT FALLS ASSESS-DOCD LE1/YR: CPT | Performed by: INTERNAL MEDICINE

## 2018-11-08 PROCEDURE — 99214 OFFICE O/P EST MOD 30 MIN: CPT | Performed by: INTERNAL MEDICINE

## 2018-11-08 PROCEDURE — 1036F TOBACCO NON-USER: CPT | Performed by: INTERNAL MEDICINE

## 2018-11-08 PROCEDURE — G8427 DOCREV CUR MEDS BY ELIG CLIN: HCPCS | Performed by: INTERNAL MEDICINE

## 2018-11-12 ENCOUNTER — TELEPHONE (OUTPATIENT)
Dept: ENT CLINIC | Age: 83
End: 2018-11-12

## 2018-11-12 NOTE — TELEPHONE ENCOUNTER
Please advise on message below, pt has recurring symptoms and would like a RF of antibiotic?  LOV- 10/22/18

## 2018-12-03 RX ORDER — CLOPIDOGREL BISULFATE 75 MG/1
TABLET ORAL
Qty: 90 TABLET | Refills: 0 | Status: SHIPPED | OUTPATIENT
Start: 2018-12-03 | End: 2019-03-04 | Stop reason: SDUPTHER

## 2018-12-19 ENCOUNTER — HOSPITAL ENCOUNTER (OUTPATIENT)
Age: 83
Discharge: HOME | End: 2018-12-19

## 2018-12-19 PROCEDURE — 9900000038 HC CARDIAC REHAB PHASE 3 - MONTHLY

## 2018-12-21 ENCOUNTER — OFFICE VISIT (OUTPATIENT)
Dept: ENT CLINIC | Age: 83
End: 2018-12-21
Payer: MEDICARE

## 2018-12-21 VITALS — HEART RATE: 60 BPM | DIASTOLIC BLOOD PRESSURE: 70 MMHG | SYSTOLIC BLOOD PRESSURE: 148 MMHG | OXYGEN SATURATION: 95 %

## 2018-12-21 DIAGNOSIS — H69.92 DISORDER OF LEFT EUSTACHIAN TUBE: Primary | ICD-10-CM

## 2018-12-21 PROCEDURE — 1101F PT FALLS ASSESS-DOCD LE1/YR: CPT | Performed by: OTOLARYNGOLOGY

## 2018-12-21 PROCEDURE — G8484 FLU IMMUNIZE NO ADMIN: HCPCS | Performed by: OTOLARYNGOLOGY

## 2018-12-21 PROCEDURE — G8427 DOCREV CUR MEDS BY ELIG CLIN: HCPCS | Performed by: OTOLARYNGOLOGY

## 2018-12-21 PROCEDURE — 1036F TOBACCO NON-USER: CPT | Performed by: OTOLARYNGOLOGY

## 2018-12-21 PROCEDURE — 4040F PNEUMOC VAC/ADMIN/RCVD: CPT | Performed by: OTOLARYNGOLOGY

## 2018-12-21 PROCEDURE — 99213 OFFICE O/P EST LOW 20 MIN: CPT | Performed by: OTOLARYNGOLOGY

## 2018-12-21 PROCEDURE — G8417 CALC BMI ABV UP PARAM F/U: HCPCS | Performed by: OTOLARYNGOLOGY

## 2018-12-21 PROCEDURE — 1123F ACP DISCUSS/DSCN MKR DOCD: CPT | Performed by: OTOLARYNGOLOGY

## 2018-12-21 PROCEDURE — G8598 ASA/ANTIPLAT THER USED: HCPCS | Performed by: OTOLARYNGOLOGY

## 2018-12-24 ENCOUNTER — TELEPHONE (OUTPATIENT)
Dept: CARDIOLOGY CLINIC | Age: 83
End: 2018-12-24

## 2018-12-24 DIAGNOSIS — I25.83 CORONARY ARTERY DISEASE DUE TO LIPID RICH PLAQUE: Chronic | ICD-10-CM

## 2018-12-24 DIAGNOSIS — I25.10 CORONARY ARTERY DISEASE DUE TO LIPID RICH PLAQUE: Chronic | ICD-10-CM

## 2018-12-24 DIAGNOSIS — I35.0 AORTIC STENOSIS, MODERATE: Primary | Chronic | ICD-10-CM

## 2018-12-24 DIAGNOSIS — I65.23 OCCLUSION AND STENOSIS OF BILATERAL CAROTID ARTERIES: ICD-10-CM

## 2018-12-29 ENCOUNTER — HOSPITAL ENCOUNTER (OUTPATIENT)
Dept: CT IMAGING | Age: 83
Discharge: HOME | End: 2018-12-29
Payer: MEDICARE

## 2018-12-29 DIAGNOSIS — H69.92 DISORDER OF LEFT EUSTACHIAN TUBE: ICD-10-CM

## 2018-12-29 PROCEDURE — 70486 CT MAXILLOFACIAL W/O DYE: CPT

## 2019-01-02 ENCOUNTER — TELEPHONE (OUTPATIENT)
Dept: ENT CLINIC | Age: 84
End: 2019-01-02

## 2019-01-08 LAB
CHOLESTEROL, TOTAL: 153 MG/DL
CHOLESTEROL: 123 MG/DL
ESTIMATED AVERAGE GLUCOSE: 154 MG/DL
FERRITIN: 164 NG/ML (ref 30–400)
FOLATE: 14 NG/ML (ref 4.5–16)
HBA1C MFR BLD: 7 % (ref 4.2–5.6)
HDLC SERPL-MCNC: 30 MG/DL
IRON SATURATION: 28 % (ref 20–50)
IRON: 110 MCG/DL (ref 50–170)
LDL CHOLESTEROL CALCULATED: 66 MG/DL
TOTAL IRON BINDING CAPACITY: 396 MCG/DL (ref 250–400)
TRIGL SERPL-MCNC: 284 MG/DL
TSH ULTRASENSITIVE: 2.33 MCIU/ML (ref 0.27–4.2)
VITAMIN B-12: 336 PG/ML (ref 211–946)
VITAMIN D 25-HYDROXY: 28.9 NG/ML (ref 30–120)

## 2019-01-09 ENCOUNTER — HOSPITAL ENCOUNTER (OUTPATIENT)
Dept: VASCULAR LAB | Age: 84
Discharge: HOME | End: 2019-01-09
Payer: MEDICARE

## 2019-01-09 DIAGNOSIS — I25.83 CORONARY ARTERY DISEASE DUE TO LIPID RICH PLAQUE: Chronic | ICD-10-CM

## 2019-01-09 DIAGNOSIS — I65.23 OCCLUSION AND STENOSIS OF BILATERAL CAROTID ARTERIES: ICD-10-CM

## 2019-01-09 DIAGNOSIS — I25.10 CORONARY ARTERY DISEASE DUE TO LIPID RICH PLAQUE: Chronic | ICD-10-CM

## 2019-01-09 DIAGNOSIS — I35.0 AORTIC STENOSIS, MODERATE: Chronic | ICD-10-CM

## 2019-01-09 PROCEDURE — 93880 EXTRACRANIAL BILAT STUDY: CPT

## 2019-01-11 PROBLEM — E66.9 OBESITY: Status: ACTIVE | Noted: 2019-01-11

## 2019-01-14 ENCOUNTER — OFFICE VISIT (OUTPATIENT)
Dept: CARDIOLOGY CLINIC | Age: 84
End: 2019-01-14
Payer: MEDICARE

## 2019-01-14 VITALS
SYSTOLIC BLOOD PRESSURE: 164 MMHG | HEIGHT: 70 IN | DIASTOLIC BLOOD PRESSURE: 80 MMHG | HEART RATE: 60 BPM | WEIGHT: 255 LBS | OXYGEN SATURATION: 94 % | BODY MASS INDEX: 36.51 KG/M2

## 2019-01-14 DIAGNOSIS — Z95.0 PACEMAKER: ICD-10-CM

## 2019-01-14 DIAGNOSIS — R00.1 BRADYCARDIA: ICD-10-CM

## 2019-01-14 DIAGNOSIS — Z95.2 S/P TAVR (TRANSCATHETER AORTIC VALVE REPLACEMENT): ICD-10-CM

## 2019-01-14 DIAGNOSIS — E66.9 OBESITY, UNSPECIFIED CLASSIFICATION, UNSPECIFIED OBESITY TYPE, UNSPECIFIED WHETHER SERIOUS COMORBIDITY PRESENT: ICD-10-CM

## 2019-01-14 DIAGNOSIS — I35.0 NONRHEUMATIC AORTIC VALVE STENOSIS: Primary | ICD-10-CM

## 2019-01-14 DIAGNOSIS — I25.10 CORONARY ARTERY DISEASE INVOLVING NATIVE CORONARY ARTERY OF NATIVE HEART WITHOUT ANGINA PECTORIS: ICD-10-CM

## 2019-01-14 PROCEDURE — 99214 OFFICE O/P EST MOD 30 MIN: CPT | Performed by: INTERNAL MEDICINE

## 2019-01-14 PROCEDURE — 4040F PNEUMOC VAC/ADMIN/RCVD: CPT | Performed by: INTERNAL MEDICINE

## 2019-01-14 PROCEDURE — 1123F ACP DISCUSS/DSCN MKR DOCD: CPT | Performed by: INTERNAL MEDICINE

## 2019-01-14 PROCEDURE — 1036F TOBACCO NON-USER: CPT | Performed by: INTERNAL MEDICINE

## 2019-01-14 PROCEDURE — G8427 DOCREV CUR MEDS BY ELIG CLIN: HCPCS | Performed by: INTERNAL MEDICINE

## 2019-01-14 PROCEDURE — 1101F PT FALLS ASSESS-DOCD LE1/YR: CPT | Performed by: INTERNAL MEDICINE

## 2019-01-14 PROCEDURE — G8417 CALC BMI ABV UP PARAM F/U: HCPCS | Performed by: INTERNAL MEDICINE

## 2019-01-14 PROCEDURE — G8598 ASA/ANTIPLAT THER USED: HCPCS | Performed by: INTERNAL MEDICINE

## 2019-01-14 PROCEDURE — G8484 FLU IMMUNIZE NO ADMIN: HCPCS | Performed by: INTERNAL MEDICINE

## 2019-01-22 ENCOUNTER — NURSE ONLY (OUTPATIENT)
Dept: CARDIOLOGY CLINIC | Age: 84
End: 2019-01-22
Payer: MEDICARE

## 2019-01-22 DIAGNOSIS — Z95.0 PACEMAKER: ICD-10-CM

## 2019-01-22 DIAGNOSIS — R00.1 BRADYCARDIA: ICD-10-CM

## 2019-01-22 PROCEDURE — 93296 REM INTERROG EVL PM/IDS: CPT | Performed by: INTERNAL MEDICINE

## 2019-01-22 PROCEDURE — 93294 REM INTERROG EVL PM/LDLS PM: CPT | Performed by: INTERNAL MEDICINE

## 2019-01-23 ENCOUNTER — TELEPHONE (OUTPATIENT)
Dept: CARDIOLOGY CLINIC | Age: 84
End: 2019-01-23

## 2019-01-25 ENCOUNTER — TELEPHONE (OUTPATIENT)
Dept: CARDIOLOGY CLINIC | Age: 84
End: 2019-01-25

## 2019-02-19 ENCOUNTER — HOSPITAL ENCOUNTER (OUTPATIENT)
Age: 84
Discharge: HOME | End: 2019-02-19
Payer: MEDICARE

## 2019-02-19 ENCOUNTER — HOSPITAL ENCOUNTER (OUTPATIENT)
Dept: GENERAL RADIOLOGY | Age: 84
Discharge: HOME | End: 2019-02-19
Payer: MEDICARE

## 2019-02-19 DIAGNOSIS — J20.8 ACUTE BRONCHITIS DUE TO OTHER SPECIFIED ORGANISMS: ICD-10-CM

## 2019-02-19 PROCEDURE — 71046 X-RAY EXAM CHEST 2 VIEWS: CPT

## 2019-02-19 PROCEDURE — 84153 ASSAY OF PSA TOTAL: CPT

## 2019-02-19 PROCEDURE — 84154 ASSAY OF PSA FREE: CPT

## 2019-02-22 LAB
PROSTATE SPECIFIC ANTIGEN FREE: <0.1 UG/L
PROSTATE SPECIFIC ANTIGEN PERCENT FREE: 6.7 %
PROSTATE SPECIFIC ANTIGEN: 0.6 UG/L (ref 0–4)

## 2019-03-01 ENCOUNTER — TELEPHONE (OUTPATIENT)
Dept: CARDIOLOGY CLINIC | Age: 84
End: 2019-03-01

## 2019-03-04 RX ORDER — CLOPIDOGREL BISULFATE 75 MG/1
TABLET ORAL
Qty: 90 TABLET | Refills: 0 | Status: SHIPPED | OUTPATIENT
Start: 2019-03-04 | End: 2019-10-14

## 2019-03-12 ENCOUNTER — APPOINTMENT (OUTPATIENT)
Dept: CT IMAGING | Age: 84
End: 2019-03-12
Payer: MEDICARE

## 2019-03-12 ENCOUNTER — APPOINTMENT (OUTPATIENT)
Dept: GENERAL RADIOLOGY | Age: 84
End: 2019-03-12
Payer: MEDICARE

## 2019-03-12 ENCOUNTER — HOSPITAL ENCOUNTER (EMERGENCY)
Age: 84
Discharge: HOME | End: 2019-03-12
Attending: EMERGENCY MEDICINE
Payer: MEDICARE

## 2019-03-12 VITALS
HEART RATE: 83 BPM | TEMPERATURE: 98 F | BODY MASS INDEX: 38.08 KG/M2 | HEIGHT: 70 IN | OXYGEN SATURATION: 95 % | WEIGHT: 266 LBS | RESPIRATION RATE: 16 BRPM | DIASTOLIC BLOOD PRESSURE: 71 MMHG | SYSTOLIC BLOOD PRESSURE: 186 MMHG
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
## 2019-03-12 DIAGNOSIS — Z53.29 LEFT AGAINST MEDICAL ADVICE: ICD-10-CM

## 2019-03-12 PROCEDURE — 71046 X-RAY EXAM CHEST 2 VIEWS: CPT

## 2019-03-12 PROCEDURE — 99284 EMERGENCY DEPT VISIT MOD MDM: CPT

## 2019-03-12 ASSESSMENT — ENCOUNTER SYMPTOMS
DIARRHEA: 0
BACK PAIN: 0
COLOR CHANGE: 0
STRIDOR: 0
SHORTNESS OF BREATH: 1
CHEST TIGHTNESS: 1
WHEEZING: 0
CONSTIPATION: 0
NAUSEA: 0
COUGH: 1
VOMITING: 0
ABDOMINAL PAIN: 0

## 2019-03-13 ENCOUNTER — TELEPHONE (OUTPATIENT)
Dept: CARDIOLOGY CLINIC | Age: 84
End: 2019-03-13

## 2019-03-14 ENCOUNTER — TELEPHONE (OUTPATIENT)
Dept: CARDIOLOGY CLINIC | Age: 84
End: 2019-03-14

## 2019-03-15 ENCOUNTER — TELEPHONE (OUTPATIENT)
Dept: CARDIOLOGY CLINIC | Age: 84
End: 2019-03-15

## 2019-03-15 ENCOUNTER — APPOINTMENT (OUTPATIENT)
Dept: CT IMAGING | Age: 84
DRG: 293 | End: 2019-03-15
Payer: MEDICARE

## 2019-03-15 ENCOUNTER — APPOINTMENT (OUTPATIENT)
Dept: GENERAL RADIOLOGY | Age: 84
DRG: 293 | End: 2019-03-15
Payer: MEDICARE

## 2019-03-15 ENCOUNTER — HOSPITAL ENCOUNTER (INPATIENT)
Age: 84
LOS: 3 days | Discharge: HOME HEALTH | DRG: 293 | End: 2019-03-18
Attending: EMERGENCY MEDICINE | Admitting: FAMILY MEDICINE
Payer: MEDICARE

## 2019-03-15 DIAGNOSIS — E87.70 HYPERVOLEMIA, UNSPECIFIED HYPERVOLEMIA TYPE: Primary | ICD-10-CM

## 2019-03-15 LAB
A/G RATIO: 1.2 (ref 1.1–2.2)
ALBUMIN SERPL-MCNC: 4.2 G/DL (ref 3.4–5)
ALP BLD-CCNC: 102 U/L (ref 40–129)
ALT SERPL-CCNC: 24 U/L (ref 10–40)
ANION GAP SERPL CALCULATED.3IONS-SCNC: 15 MMOL/L (ref 3–16)
AST SERPL-CCNC: 43 U/L (ref 15–37)
BASOPHILS ABSOLUTE: 0.1 K/UL (ref 0–0.2)
BASOPHILS RELATIVE PERCENT: 0.8 %
BILIRUB SERPL-MCNC: 0.5 MG/DL (ref 0–1)
BUN BLDV-MCNC: 21 MG/DL (ref 7–20)
CALCIUM SERPL-MCNC: 9.8 MG/DL (ref 8.3–10.6)
CHLORIDE BLD-SCNC: 95 MMOL/L (ref 99–110)
CO2: 25 MMOL/L (ref 21–32)
CREAT SERPL-MCNC: 1.2 MG/DL (ref 0.8–1.3)
EOSINOPHILS ABSOLUTE: 0.3 K/UL (ref 0–0.6)
EOSINOPHILS RELATIVE PERCENT: 4.5 %
GFR AFRICAN AMERICAN: >60
GFR NON-AFRICAN AMERICAN: 57
GLOBULIN: 3.4 G/DL
GLUCOSE BLD-MCNC: 154 MG/DL (ref 70–99)
HCT VFR BLD CALC: 39.3 % (ref 40.5–52.5)
HEMOGLOBIN: 13.6 G/DL (ref 13.5–17.5)
LYMPHOCYTES ABSOLUTE: 3.7 K/UL (ref 1–5.1)
LYMPHOCYTES RELATIVE PERCENT: 53.4 %
MCH RBC QN AUTO: 30 PG (ref 26–34)
MCHC RBC AUTO-ENTMCNC: 34.5 G/DL (ref 31–36)
MCV RBC AUTO: 86.8 FL (ref 80–100)
MONOCYTES ABSOLUTE: 0.5 K/UL (ref 0–1.3)
MONOCYTES RELATIVE PERCENT: 7.5 %
NEUTROPHILS ABSOLUTE: 2.4 K/UL (ref 1.7–7.7)
NEUTROPHILS RELATIVE PERCENT: 33.8 %
PDW BLD-RTO: 13.9 % (ref 12.4–15.4)
PLATELET # BLD: 217 K/UL (ref 135–450)
PMV BLD AUTO: 9.8 FL (ref 5–10.5)
POTASSIUM SERPL-SCNC: 3.5 MMOL/L (ref 3.5–5.1)
PRO-BNP: 950 PG/ML (ref 0–449)
RBC # BLD: 4.53 M/UL (ref 4.2–5.9)
SODIUM BLD-SCNC: 135 MMOL/L (ref 136–145)
TOTAL PROTEIN: 7.6 G/DL (ref 6.4–8.2)
TROPONIN: 0.01 NG/ML
WBC # BLD: 7 K/UL (ref 4–11)

## 2019-03-15 PROCEDURE — 6370000000 HC RX 637 (ALT 250 FOR IP): Performed by: FAMILY MEDICINE

## 2019-03-15 PROCEDURE — 96374 THER/PROPH/DIAG INJ IV PUSH: CPT

## 2019-03-15 PROCEDURE — 71046 X-RAY EXAM CHEST 2 VIEWS: CPT

## 2019-03-15 PROCEDURE — 6360000002 HC RX W HCPCS: Performed by: EMERGENCY MEDICINE

## 2019-03-15 PROCEDURE — 6360000004 HC RX CONTRAST MEDICATION: Performed by: EMERGENCY MEDICINE

## 2019-03-15 PROCEDURE — 99285 EMERGENCY DEPT VISIT HI MDM: CPT

## 2019-03-15 PROCEDURE — 93005 ELECTROCARDIOGRAM TRACING: CPT | Performed by: EMERGENCY MEDICINE

## 2019-03-15 PROCEDURE — 6360000002 HC RX W HCPCS: Performed by: FAMILY MEDICINE

## 2019-03-15 PROCEDURE — 84484 ASSAY OF TROPONIN QUANT: CPT

## 2019-03-15 PROCEDURE — 80053 COMPREHEN METABOLIC PANEL: CPT

## 2019-03-15 PROCEDURE — 87040 BLOOD CULTURE FOR BACTERIA: CPT

## 2019-03-15 PROCEDURE — 1200000000 HC SEMI PRIVATE

## 2019-03-15 PROCEDURE — 85025 COMPLETE CBC W/AUTO DIFF WBC: CPT

## 2019-03-15 PROCEDURE — 83880 ASSAY OF NATRIURETIC PEPTIDE: CPT

## 2019-03-15 PROCEDURE — 71260 CT THORAX DX C+: CPT

## 2019-03-15 PROCEDURE — 2580000003 HC RX 258: Performed by: FAMILY MEDICINE

## 2019-03-15 RX ORDER — SODIUM CHLORIDE 0.9 % (FLUSH) 0.9 %
10 SYRINGE (ML) INJECTION PRN
Status: DISCONTINUED | OUTPATIENT
Start: 2019-03-15 | End: 2019-03-17 | Stop reason: SDUPTHER

## 2019-03-15 RX ORDER — GEMFIBROZIL 600 MG/1
600 TABLET, FILM COATED ORAL 2 TIMES DAILY
Status: DISCONTINUED | OUTPATIENT
Start: 2019-03-15 | End: 2019-03-18 | Stop reason: HOSPADM

## 2019-03-15 RX ORDER — FUROSEMIDE 10 MG/ML
40 INJECTION INTRAMUSCULAR; INTRAVENOUS ONCE
Status: DISCONTINUED | OUTPATIENT
Start: 2019-03-16 | End: 2019-03-15

## 2019-03-15 RX ORDER — LEVOFLOXACIN 5 MG/ML
500 INJECTION, SOLUTION INTRAVENOUS EVERY 24 HOURS
Status: DISCONTINUED | OUTPATIENT
Start: 2019-03-15 | End: 2019-03-15

## 2019-03-15 RX ORDER — ASPIRIN 81 MG/1
81 TABLET, CHEWABLE ORAL DAILY
Status: DISCONTINUED | OUTPATIENT
Start: 2019-03-16 | End: 2019-03-18 | Stop reason: HOSPADM

## 2019-03-15 RX ORDER — ACETAMINOPHEN 80 MG
TABLET,CHEWABLE ORAL
Status: COMPLETED
Start: 2019-03-15 | End: 2019-03-15

## 2019-03-15 RX ORDER — SODIUM CHLORIDE 0.9 % (FLUSH) 0.9 %
10 SYRINGE (ML) INJECTION EVERY 12 HOURS SCHEDULED
Status: DISCONTINUED | OUTPATIENT
Start: 2019-03-15 | End: 2019-03-17 | Stop reason: SDUPTHER

## 2019-03-15 RX ORDER — HYDRALAZINE HYDROCHLORIDE 20 MG/ML
5 INJECTION INTRAMUSCULAR; INTRAVENOUS EVERY 6 HOURS PRN
Status: DISCONTINUED | OUTPATIENT
Start: 2019-03-15 | End: 2019-03-16

## 2019-03-15 RX ORDER — FUROSEMIDE 10 MG/ML
40 INJECTION INTRAMUSCULAR; INTRAVENOUS ONCE
Status: COMPLETED | OUTPATIENT
Start: 2019-03-15 | End: 2019-03-15

## 2019-03-15 RX ORDER — CLOPIDOGREL BISULFATE 75 MG/1
75 TABLET ORAL DAILY
Status: DISCONTINUED | OUTPATIENT
Start: 2019-03-16 | End: 2019-03-18 | Stop reason: HOSPADM

## 2019-03-15 RX ORDER — PRAVASTATIN SODIUM 40 MG
40 TABLET ORAL NIGHTLY
Status: DISCONTINUED | OUTPATIENT
Start: 2019-03-15 | End: 2019-03-18 | Stop reason: HOSPADM

## 2019-03-15 RX ORDER — ONDANSETRON 2 MG/ML
4 INJECTION INTRAMUSCULAR; INTRAVENOUS EVERY 6 HOURS PRN
Status: DISCONTINUED | OUTPATIENT
Start: 2019-03-15 | End: 2019-03-17 | Stop reason: SDUPTHER

## 2019-03-15 RX ORDER — BUDESONIDE AND FORMOTEROL FUMARATE DIHYDRATE 160; 4.5 UG/1; UG/1
2 AEROSOL RESPIRATORY (INHALATION) 2 TIMES DAILY
Status: DISCONTINUED | OUTPATIENT
Start: 2019-03-15 | End: 2019-03-15 | Stop reason: CLARIF

## 2019-03-15 RX ORDER — FUROSEMIDE 10 MG/ML
40 INJECTION INTRAMUSCULAR; INTRAVENOUS DAILY
Status: DISCONTINUED | OUTPATIENT
Start: 2019-03-16 | End: 2019-03-15 | Stop reason: SDUPTHER

## 2019-03-15 RX ORDER — ACETAMINOPHEN 325 MG/1
650 TABLET ORAL EVERY 4 HOURS PRN
Status: DISCONTINUED | OUTPATIENT
Start: 2019-03-15 | End: 2019-03-18 | Stop reason: HOSPADM

## 2019-03-15 RX ORDER — FUROSEMIDE 10 MG/ML
40 INJECTION INTRAMUSCULAR; INTRAVENOUS DAILY
Status: DISCONTINUED | OUTPATIENT
Start: 2019-03-16 | End: 2019-03-17

## 2019-03-15 RX ADMIN — FUROSEMIDE 40 MG: 10 INJECTION, SOLUTION INTRAVENOUS at 18:08

## 2019-03-15 RX ADMIN — PRAVASTATIN SODIUM 40 MG: 40 TABLET ORAL at 23:45

## 2019-03-15 RX ADMIN — MAGNESIUM HYDROXIDE 30 ML: 400 SUSPENSION ORAL at 23:44

## 2019-03-15 RX ADMIN — IOPAMIDOL 75 ML: 755 INJECTION, SOLUTION INTRAVENOUS at 19:04

## 2019-03-15 RX ADMIN — METOPROLOL TARTRATE 12.5 MG: 25 TABLET ORAL at 23:44

## 2019-03-15 RX ADMIN — Medication: at 23:57

## 2019-03-15 RX ADMIN — GEMFIBROZIL 600 MG: 600 TABLET ORAL at 23:45

## 2019-03-15 RX ADMIN — Medication 10 ML: at 23:45

## 2019-03-15 RX ADMIN — HYDRALAZINE HYDROCHLORIDE 5 MG: 20 INJECTION INTRAMUSCULAR; INTRAVENOUS at 23:44

## 2019-03-16 LAB
EKG ATRIAL RATE: 60 BPM
EKG ATRIAL RATE: 72 BPM
EKG DIAGNOSIS: NORMAL
EKG DIAGNOSIS: NORMAL
EKG P AXIS: 83 DEGREES
EKG P-R INTERVAL: 166 MS
EKG P-R INTERVAL: 200 MS
EKG Q-T INTERVAL: 506 MS
EKG Q-T INTERVAL: 524 MS
EKG QRS DURATION: 200 MS
EKG QRS DURATION: 210 MS
EKG QTC CALCULATION (BAZETT): 506 MS
EKG QTC CALCULATION (BAZETT): 573 MS
EKG R AXIS: -62 DEGREES
EKG R AXIS: -67 DEGREES
EKG T AXIS: 104 DEGREES
EKG T AXIS: 96 DEGREES
EKG VENTRICULAR RATE: 60 BPM
EKG VENTRICULAR RATE: 72 BPM

## 2019-03-16 PROCEDURE — 2580000003 HC RX 258: Performed by: FAMILY MEDICINE

## 2019-03-16 PROCEDURE — 6360000002 HC RX W HCPCS: Performed by: NURSE PRACTITIONER

## 2019-03-16 PROCEDURE — 93010 ELECTROCARDIOGRAM REPORT: CPT | Performed by: INTERNAL MEDICINE

## 2019-03-16 PROCEDURE — 6370000000 HC RX 637 (ALT 250 FOR IP): Performed by: FAMILY MEDICINE

## 2019-03-16 PROCEDURE — 6370000000 HC RX 637 (ALT 250 FOR IP): Performed by: INTERNAL MEDICINE

## 2019-03-16 PROCEDURE — 6370000000 HC RX 637 (ALT 250 FOR IP): Performed by: HOSPITALIST

## 2019-03-16 PROCEDURE — 6360000002 HC RX W HCPCS: Performed by: FAMILY MEDICINE

## 2019-03-16 PROCEDURE — 94640 AIRWAY INHALATION TREATMENT: CPT

## 2019-03-16 PROCEDURE — 93005 ELECTROCARDIOGRAM TRACING: CPT | Performed by: FAMILY MEDICINE

## 2019-03-16 PROCEDURE — 94760 N-INVAS EAR/PLS OXIMETRY 1: CPT

## 2019-03-16 PROCEDURE — 6370000000 HC RX 637 (ALT 250 FOR IP): Performed by: NURSE PRACTITIONER

## 2019-03-16 PROCEDURE — 1200000000 HC SEMI PRIVATE

## 2019-03-16 RX ORDER — HYDROCODONE BITARTRATE AND ACETAMINOPHEN 5; 325 MG/1; MG/1
1 TABLET ORAL EVERY 6 HOURS PRN
Status: DISCONTINUED | OUTPATIENT
Start: 2019-03-16 | End: 2019-03-18 | Stop reason: HOSPADM

## 2019-03-16 RX ORDER — DIPHENHYDRAMINE HYDROCHLORIDE 50 MG/ML
12.5 INJECTION INTRAMUSCULAR; INTRAVENOUS ONCE
Status: COMPLETED | OUTPATIENT
Start: 2019-03-16 | End: 2019-03-16

## 2019-03-16 RX ORDER — DOCUSATE SODIUM 100 MG/1
100 CAPSULE, LIQUID FILLED ORAL 2 TIMES DAILY
Status: DISCONTINUED | OUTPATIENT
Start: 2019-03-16 | End: 2019-03-18 | Stop reason: HOSPADM

## 2019-03-16 RX ORDER — METHYLPREDNISOLONE SODIUM SUCCINATE 125 MG/2ML
60 INJECTION, POWDER, LYOPHILIZED, FOR SOLUTION INTRAMUSCULAR; INTRAVENOUS ONCE
Status: COMPLETED | OUTPATIENT
Start: 2019-03-16 | End: 2019-03-16

## 2019-03-16 RX ORDER — FAMOTIDINE 20 MG/1
40 TABLET, FILM COATED ORAL ONCE
Status: COMPLETED | OUTPATIENT
Start: 2019-03-16 | End: 2019-03-16

## 2019-03-16 RX ORDER — HYDROXYZINE HYDROCHLORIDE 10 MG/1
10 TABLET, FILM COATED ORAL NIGHTLY PRN
Status: DISCONTINUED | OUTPATIENT
Start: 2019-03-16 | End: 2019-03-18 | Stop reason: HOSPADM

## 2019-03-16 RX ADMIN — Medication 10 ML: at 10:17

## 2019-03-16 RX ADMIN — GEMFIBROZIL 600 MG: 600 TABLET ORAL at 10:15

## 2019-03-16 RX ADMIN — FUROSEMIDE 40 MG: 10 INJECTION, SOLUTION INTRAMUSCULAR; INTRAVENOUS at 10:15

## 2019-03-16 RX ADMIN — ASPIRIN 81 MG 81 MG: 81 TABLET ORAL at 10:17

## 2019-03-16 RX ADMIN — HYDROCODONE BITARTRATE AND ACETAMINOPHEN 1 TABLET: 5; 325 TABLET ORAL at 14:19

## 2019-03-16 RX ADMIN — CLOPIDOGREL 75 MG: 75 TABLET, FILM COATED ORAL at 10:16

## 2019-03-16 RX ADMIN — Medication: at 00:49

## 2019-03-16 RX ADMIN — DOCUSATE SODIUM 100 MG: 100 CAPSULE, LIQUID FILLED ORAL at 21:19

## 2019-03-16 RX ADMIN — Medication 2 PUFF: at 22:31

## 2019-03-16 RX ADMIN — METOPROLOL TARTRATE 12.5 MG: 25 TABLET ORAL at 10:16

## 2019-03-16 RX ADMIN — ENOXAPARIN SODIUM 40 MG: 40 INJECTION SUBCUTANEOUS at 10:15

## 2019-03-16 RX ADMIN — Medication 1 EACH: at 12:09

## 2019-03-16 RX ADMIN — MAGNESIUM HYDROXIDE 30 ML: 400 SUSPENSION ORAL at 16:55

## 2019-03-16 RX ADMIN — BENZOCAINE, MENTHOL 1 LOZENGE: 15; 3.6 LOZENGE ORAL at 01:24

## 2019-03-16 RX ADMIN — METHYLPREDNISOLONE SODIUM SUCCINATE 60 MG: 125 INJECTION, POWDER, FOR SOLUTION INTRAMUSCULAR; INTRAVENOUS at 00:48

## 2019-03-16 RX ADMIN — DIPHENHYDRAMINE HYDROCHLORIDE 12.5 MG: 50 INJECTION, SOLUTION INTRAMUSCULAR; INTRAVENOUS at 00:48

## 2019-03-16 RX ADMIN — Medication 2 PUFF: at 12:41

## 2019-03-16 RX ADMIN — Medication 10 ML: at 21:22

## 2019-03-16 RX ADMIN — FAMOTIDINE 40 MG: 20 TABLET ORAL at 00:49

## 2019-03-16 RX ADMIN — METOPROLOL TARTRATE 12.5 MG: 25 TABLET ORAL at 21:19

## 2019-03-16 RX ADMIN — PRAVASTATIN SODIUM 40 MG: 40 TABLET ORAL at 21:19

## 2019-03-16 RX ADMIN — GEMFIBROZIL 600 MG: 600 TABLET ORAL at 21:19

## 2019-03-16 ASSESSMENT — PAIN SCALES - GENERAL
PAINLEVEL_OUTOF10: 0
PAINLEVEL_OUTOF10: 5
PAINLEVEL_OUTOF10: 0

## 2019-03-17 LAB
ANION GAP SERPL CALCULATED.3IONS-SCNC: 13 MMOL/L (ref 3–16)
BUN BLDV-MCNC: 30 MG/DL (ref 7–20)
CALCIUM SERPL-MCNC: 9.5 MG/DL (ref 8.3–10.6)
CHLORIDE BLD-SCNC: 94 MMOL/L (ref 99–110)
CO2: 30 MMOL/L (ref 21–32)
CREAT SERPL-MCNC: 1.1 MG/DL (ref 0.8–1.3)
GFR AFRICAN AMERICAN: >60
GFR NON-AFRICAN AMERICAN: >60
GLUCOSE BLD-MCNC: 170 MG/DL (ref 70–99)
MAGNESIUM: 2.6 MG/DL (ref 1.8–2.4)
POTASSIUM SERPL-SCNC: 3.8 MMOL/L (ref 3.5–5.1)
SODIUM BLD-SCNC: 137 MMOL/L (ref 136–145)
TSH SERPL DL<=0.05 MIU/L-ACNC: 1.7 UIU/ML (ref 0.27–4.2)

## 2019-03-17 PROCEDURE — 6370000000 HC RX 637 (ALT 250 FOR IP): Performed by: HOSPITALIST

## 2019-03-17 PROCEDURE — 2580000003 HC RX 258: Performed by: INTERNAL MEDICINE

## 2019-03-17 PROCEDURE — 97161 PT EVAL LOW COMPLEX 20 MIN: CPT

## 2019-03-17 PROCEDURE — 97116 GAIT TRAINING THERAPY: CPT

## 2019-03-17 PROCEDURE — 6370000000 HC RX 637 (ALT 250 FOR IP): Performed by: NURSE PRACTITIONER

## 2019-03-17 PROCEDURE — 6370000000 HC RX 637 (ALT 250 FOR IP): Performed by: FAMILY MEDICINE

## 2019-03-17 PROCEDURE — 6370000000 HC RX 637 (ALT 250 FOR IP): Performed by: INTERNAL MEDICINE

## 2019-03-17 PROCEDURE — 97165 OT EVAL LOW COMPLEX 30 MIN: CPT

## 2019-03-17 PROCEDURE — 2580000003 HC RX 258: Performed by: FAMILY MEDICINE

## 2019-03-17 PROCEDURE — 94760 N-INVAS EAR/PLS OXIMETRY 1: CPT

## 2019-03-17 PROCEDURE — 94640 AIRWAY INHALATION TREATMENT: CPT

## 2019-03-17 PROCEDURE — 36415 COLL VENOUS BLD VENIPUNCTURE: CPT

## 2019-03-17 PROCEDURE — 97162 PT EVAL MOD COMPLEX 30 MIN: CPT

## 2019-03-17 PROCEDURE — 97535 SELF CARE MNGMENT TRAINING: CPT

## 2019-03-17 PROCEDURE — 99223 1ST HOSP IP/OBS HIGH 75: CPT | Performed by: INTERNAL MEDICINE

## 2019-03-17 PROCEDURE — 6360000002 HC RX W HCPCS: Performed by: INTERNAL MEDICINE

## 2019-03-17 PROCEDURE — 84443 ASSAY THYROID STIM HORMONE: CPT

## 2019-03-17 PROCEDURE — 1200000000 HC SEMI PRIVATE

## 2019-03-17 PROCEDURE — 83735 ASSAY OF MAGNESIUM: CPT

## 2019-03-17 PROCEDURE — 80048 BASIC METABOLIC PNL TOTAL CA: CPT

## 2019-03-17 RX ORDER — AMLODIPINE BESYLATE 5 MG/1
5 TABLET ORAL DAILY
Status: DISCONTINUED | OUTPATIENT
Start: 2019-03-17 | End: 2019-03-18 | Stop reason: HOSPADM

## 2019-03-17 RX ORDER — SODIUM PHOSPHATE, DIBASIC AND SODIUM PHOSPHATE, MONOBASIC 7; 19 G/133ML; G/133ML
1 ENEMA RECTAL
Status: ACTIVE | OUTPATIENT
Start: 2019-03-17 | End: 2019-03-17

## 2019-03-17 RX ORDER — FUROSEMIDE 10 MG/ML
40 INJECTION INTRAMUSCULAR; INTRAVENOUS 2 TIMES DAILY
Status: DISCONTINUED | OUTPATIENT
Start: 2019-03-17 | End: 2019-03-18 | Stop reason: HOSPADM

## 2019-03-17 RX ORDER — ONDANSETRON 2 MG/ML
4 INJECTION INTRAMUSCULAR; INTRAVENOUS EVERY 6 HOURS PRN
Status: DISCONTINUED | OUTPATIENT
Start: 2019-03-17 | End: 2019-03-18 | Stop reason: HOSPADM

## 2019-03-17 RX ORDER — SODIUM CHLORIDE 0.9 % (FLUSH) 0.9 %
10 SYRINGE (ML) INJECTION EVERY 12 HOURS SCHEDULED
Status: DISCONTINUED | OUTPATIENT
Start: 2019-03-17 | End: 2019-03-18 | Stop reason: HOSPADM

## 2019-03-17 RX ORDER — SODIUM CHLORIDE 0.9 % (FLUSH) 0.9 %
10 SYRINGE (ML) INJECTION PRN
Status: DISCONTINUED | OUTPATIENT
Start: 2019-03-17 | End: 2019-03-18 | Stop reason: HOSPADM

## 2019-03-17 RX ORDER — BISACODYL 10 MG
10 SUPPOSITORY, RECTAL RECTAL DAILY PRN
Status: DISCONTINUED | OUTPATIENT
Start: 2019-03-17 | End: 2019-03-18 | Stop reason: HOSPADM

## 2019-03-17 RX ORDER — POLYETHYLENE GLYCOL 3350 17 G/17G
17 POWDER, FOR SOLUTION ORAL DAILY
Status: DISCONTINUED | OUTPATIENT
Start: 2019-03-17 | End: 2019-03-18 | Stop reason: HOSPADM

## 2019-03-17 RX ORDER — LISINOPRIL 5 MG/1
5 TABLET ORAL DAILY
Status: DISCONTINUED | OUTPATIENT
Start: 2019-03-17 | End: 2019-03-18 | Stop reason: HOSPADM

## 2019-03-17 RX ORDER — METOPROLOL SUCCINATE 25 MG/1
25 TABLET, EXTENDED RELEASE ORAL DAILY
Status: DISCONTINUED | OUTPATIENT
Start: 2019-03-17 | End: 2019-03-18 | Stop reason: HOSPADM

## 2019-03-17 RX ORDER — SPIRONOLACTONE 25 MG/1
25 TABLET ORAL DAILY
Status: DISCONTINUED | OUTPATIENT
Start: 2019-03-17 | End: 2019-03-18 | Stop reason: HOSPADM

## 2019-03-17 RX ADMIN — CLOPIDOGREL 75 MG: 75 TABLET, FILM COATED ORAL at 10:33

## 2019-03-17 RX ADMIN — AMLODIPINE BESYLATE 5 MG: 5 TABLET ORAL at 16:25

## 2019-03-17 RX ADMIN — FUROSEMIDE 40 MG: 10 INJECTION, SOLUTION INTRAMUSCULAR; INTRAVENOUS at 10:33

## 2019-03-17 RX ADMIN — SPIRONOLACTONE 25 MG: 25 TABLET ORAL at 10:33

## 2019-03-17 RX ADMIN — HYDROXYZINE HYDROCHLORIDE 10 MG: 10 TABLET ORAL at 20:33

## 2019-03-17 RX ADMIN — POLYETHYLENE GLYCOL 3350 17 G: 17 POWDER, FOR SOLUTION ORAL at 10:32

## 2019-03-17 RX ADMIN — METOPROLOL SUCCINATE 25 MG: 25 TABLET, FILM COATED, EXTENDED RELEASE ORAL at 10:33

## 2019-03-17 RX ADMIN — LISINOPRIL 5 MG: 5 TABLET ORAL at 10:33

## 2019-03-17 RX ADMIN — FUROSEMIDE 40 MG: 10 INJECTION, SOLUTION INTRAMUSCULAR; INTRAVENOUS at 18:14

## 2019-03-17 RX ADMIN — PRAVASTATIN SODIUM 40 MG: 40 TABLET ORAL at 20:33

## 2019-03-17 RX ADMIN — BISACODYL 10 MG: 10 SUPPOSITORY RECTAL at 10:33

## 2019-03-17 RX ADMIN — GEMFIBROZIL 600 MG: 600 TABLET ORAL at 10:33

## 2019-03-17 RX ADMIN — Medication 10 ML: at 10:39

## 2019-03-17 RX ADMIN — ENOXAPARIN SODIUM 40 MG: 40 INJECTION SUBCUTANEOUS at 10:32

## 2019-03-17 RX ADMIN — DOCUSATE SODIUM 100 MG: 100 CAPSULE, LIQUID FILLED ORAL at 10:33

## 2019-03-17 RX ADMIN — Medication 2 PUFF: at 20:48

## 2019-03-17 RX ADMIN — ASPIRIN 81 MG 81 MG: 81 TABLET ORAL at 10:33

## 2019-03-17 RX ADMIN — GEMFIBROZIL 600 MG: 600 TABLET ORAL at 20:33

## 2019-03-17 RX ADMIN — DOCUSATE SODIUM 100 MG: 100 CAPSULE, LIQUID FILLED ORAL at 20:33

## 2019-03-17 RX ADMIN — Medication 10 ML: at 10:38

## 2019-03-17 RX ADMIN — Medication 10 ML: at 20:33

## 2019-03-17 ASSESSMENT — PAIN SCALES - GENERAL
PAINLEVEL_OUTOF10: 0

## 2019-03-18 VITALS
DIASTOLIC BLOOD PRESSURE: 77 MMHG | OXYGEN SATURATION: 95 % | WEIGHT: 261.9 LBS | BODY MASS INDEX: 37.5 KG/M2 | RESPIRATION RATE: 18 BRPM | HEART RATE: 60 BPM | TEMPERATURE: 97.4 F | SYSTOLIC BLOOD PRESSURE: 165 MMHG | HEIGHT: 70 IN

## 2019-03-18 LAB
ANION GAP SERPL CALCULATED.3IONS-SCNC: 15 MMOL/L (ref 3–16)
BUN BLDV-MCNC: 34 MG/DL (ref 7–20)
CALCIUM SERPL-MCNC: 9.1 MG/DL (ref 8.3–10.6)
CHLORIDE BLD-SCNC: 96 MMOL/L (ref 99–110)
CHOLESTEROL, TOTAL: 143 MG/DL (ref 0–199)
CO2: 27 MMOL/L (ref 21–32)
CREAT SERPL-MCNC: 1.1 MG/DL (ref 0.8–1.3)
GFR AFRICAN AMERICAN: >60
GFR NON-AFRICAN AMERICAN: >60
GLUCOSE BLD-MCNC: 155 MG/DL (ref 70–99)
HDLC SERPL-MCNC: 29 MG/DL (ref 40–60)
LDL CHOLESTEROL CALCULATED: ABNORMAL MG/DL
LDL CHOLESTEROL DIRECT: 77 MG/DL
LEFT VENTRICULAR EJECTION FRACTION HIGH VALUE: 35 %
LEFT VENTRICULAR EJECTION FRACTION MODE: NORMAL
LV EF: 30 %
LV EF: 33 %
LVEF MODALITY: NORMAL
MAGNESIUM: 2.7 MG/DL (ref 1.8–2.4)
POTASSIUM SERPL-SCNC: 3.5 MMOL/L (ref 3.5–5.1)
SODIUM BLD-SCNC: 138 MMOL/L (ref 136–145)
TRIGL SERPL-MCNC: 337 MG/DL (ref 0–150)
VLDLC SERPL CALC-MCNC: ABNORMAL MG/DL

## 2019-03-18 PROCEDURE — 80048 BASIC METABOLIC PNL TOTAL CA: CPT

## 2019-03-18 PROCEDURE — 6370000000 HC RX 637 (ALT 250 FOR IP): Performed by: INTERNAL MEDICINE

## 2019-03-18 PROCEDURE — 80061 LIPID PANEL: CPT

## 2019-03-18 PROCEDURE — 99233 SBSQ HOSP IP/OBS HIGH 50: CPT | Performed by: INTERNAL MEDICINE

## 2019-03-18 PROCEDURE — 83735 ASSAY OF MAGNESIUM: CPT

## 2019-03-18 PROCEDURE — 93306 TTE W/DOPPLER COMPLETE: CPT

## 2019-03-18 PROCEDURE — 6370000000 HC RX 637 (ALT 250 FOR IP): Performed by: FAMILY MEDICINE

## 2019-03-18 PROCEDURE — 6360000002 HC RX W HCPCS: Performed by: INTERNAL MEDICINE

## 2019-03-18 PROCEDURE — 6370000000 HC RX 637 (ALT 250 FOR IP): Performed by: NURSE PRACTITIONER

## 2019-03-18 PROCEDURE — 2580000003 HC RX 258: Performed by: INTERNAL MEDICINE

## 2019-03-18 PROCEDURE — 36415 COLL VENOUS BLD VENIPUNCTURE: CPT

## 2019-03-18 PROCEDURE — 94640 AIRWAY INHALATION TREATMENT: CPT

## 2019-03-18 PROCEDURE — 97116 GAIT TRAINING THERAPY: CPT

## 2019-03-18 PROCEDURE — 97530 THERAPEUTIC ACTIVITIES: CPT

## 2019-03-18 PROCEDURE — 94760 N-INVAS EAR/PLS OXIMETRY 1: CPT

## 2019-03-18 RX ORDER — LISINOPRIL 5 MG/1
5 TABLET ORAL DAILY
Qty: 30 TABLET | Refills: 3 | Status: SHIPPED | OUTPATIENT
Start: 2019-03-19 | End: 2019-03-25 | Stop reason: SINTOL

## 2019-03-18 RX ORDER — SPIRONOLACTONE 25 MG/1
25 TABLET ORAL DAILY
Qty: 30 TABLET | Refills: 3 | Status: SHIPPED | OUTPATIENT
Start: 2019-03-19 | End: 2020-09-17 | Stop reason: DRUGHIGH

## 2019-03-18 RX ADMIN — POLYETHYLENE GLYCOL 3350 17 G: 17 POWDER, FOR SOLUTION ORAL at 09:04

## 2019-03-18 RX ADMIN — CLOPIDOGREL 75 MG: 75 TABLET, FILM COATED ORAL at 09:03

## 2019-03-18 RX ADMIN — ENOXAPARIN SODIUM 40 MG: 40 INJECTION SUBCUTANEOUS at 09:04

## 2019-03-18 RX ADMIN — METOPROLOL SUCCINATE 25 MG: 25 TABLET, FILM COATED, EXTENDED RELEASE ORAL at 09:04

## 2019-03-18 RX ADMIN — AMLODIPINE BESYLATE 5 MG: 5 TABLET ORAL at 09:04

## 2019-03-18 RX ADMIN — GEMFIBROZIL 600 MG: 600 TABLET ORAL at 09:04

## 2019-03-18 RX ADMIN — Medication 10 ML: at 09:05

## 2019-03-18 RX ADMIN — ASPIRIN 81 MG 81 MG: 81 TABLET ORAL at 09:03

## 2019-03-18 RX ADMIN — DOCUSATE SODIUM 100 MG: 100 CAPSULE, LIQUID FILLED ORAL at 09:03

## 2019-03-18 RX ADMIN — Medication 2 PUFF: at 09:39

## 2019-03-18 RX ADMIN — FUROSEMIDE 40 MG: 10 INJECTION, SOLUTION INTRAMUSCULAR; INTRAVENOUS at 09:04

## 2019-03-18 RX ADMIN — LISINOPRIL 5 MG: 5 TABLET ORAL at 09:03

## 2019-03-18 RX ADMIN — SPIRONOLACTONE 25 MG: 25 TABLET ORAL at 09:03

## 2019-03-18 ASSESSMENT — PAIN SCALES - GENERAL
PAINLEVEL_OUTOF10: 0
PAINLEVEL_OUTOF10: 0

## 2019-03-18 ASSESSMENT — PAIN DESCRIPTION - DESCRIPTORS: DESCRIPTORS: HEADACHE

## 2019-03-18 ASSESSMENT — PAIN DESCRIPTION - PAIN TYPE: TYPE: ACUTE PAIN

## 2019-03-18 ASSESSMENT — PAIN DESCRIPTION - LOCATION: LOCATION: HEAD

## 2019-03-19 ENCOUNTER — TELEPHONE (OUTPATIENT)
Dept: OTHER | Age: 84
End: 2019-03-19

## 2019-03-20 ENCOUNTER — TELEPHONE (OUTPATIENT)
Dept: OTHER | Age: 84
End: 2019-03-20

## 2019-03-20 LAB
BLOOD CULTURE, ROUTINE: NORMAL
CULTURE, BLOOD 2: NORMAL

## 2019-03-21 ENCOUNTER — HOSPITAL ENCOUNTER (OUTPATIENT)
Age: 84
Discharge: HOME | End: 2019-03-21
Payer: MEDICARE

## 2019-03-21 ENCOUNTER — TELEPHONE (OUTPATIENT)
Dept: OTHER | Age: 84
End: 2019-03-21

## 2019-03-21 ENCOUNTER — TELEPHONE (OUTPATIENT)
Dept: CARDIOLOGY CLINIC | Age: 84
End: 2019-03-21

## 2019-03-21 LAB
ANION GAP SERPL CALCULATED.3IONS-SCNC: 15 MMOL/L (ref 3–16)
BUN BLDV-MCNC: 31 MG/DL (ref 7–20)
CALCIUM SERPL-MCNC: 9.2 MG/DL (ref 8.3–10.6)
CHLORIDE BLD-SCNC: 97 MMOL/L (ref 99–110)
CO2: 21 MMOL/L (ref 21–32)
CREAT SERPL-MCNC: 1.2 MG/DL (ref 0.8–1.3)
GFR AFRICAN AMERICAN: >60
GFR NON-AFRICAN AMERICAN: 57
GLUCOSE BLD-MCNC: 150 MG/DL (ref 70–99)
POTASSIUM SERPL-SCNC: 4.1 MMOL/L (ref 3.5–5.1)
PRO-BNP: 716 PG/ML (ref 0–449)
SODIUM BLD-SCNC: 133 MMOL/L (ref 136–145)

## 2019-03-21 PROCEDURE — 80048 BASIC METABOLIC PNL TOTAL CA: CPT

## 2019-03-21 PROCEDURE — 36415 COLL VENOUS BLD VENIPUNCTURE: CPT

## 2019-03-21 PROCEDURE — 83880 ASSAY OF NATRIURETIC PEPTIDE: CPT

## 2019-03-25 ENCOUNTER — OFFICE VISIT (OUTPATIENT)
Dept: CARDIOLOGY CLINIC | Age: 84
End: 2019-03-25
Payer: MEDICARE

## 2019-03-25 ENCOUNTER — TELEPHONE (OUTPATIENT)
Dept: CARDIOLOGY CLINIC | Age: 84
End: 2019-03-25

## 2019-03-25 VITALS
SYSTOLIC BLOOD PRESSURE: 136 MMHG | BODY MASS INDEX: 37.8 KG/M2 | HEIGHT: 70 IN | WEIGHT: 264 LBS | OXYGEN SATURATION: 94 % | HEART RATE: 60 BPM | RESPIRATION RATE: 16 BRPM | DIASTOLIC BLOOD PRESSURE: 64 MMHG

## 2019-03-25 DIAGNOSIS — G47.33 OSA (OBSTRUCTIVE SLEEP APNEA): ICD-10-CM

## 2019-03-25 DIAGNOSIS — I25.10 CORONARY ARTERY DISEASE INVOLVING NATIVE CORONARY ARTERY OF NATIVE HEART WITHOUT ANGINA PECTORIS: ICD-10-CM

## 2019-03-25 DIAGNOSIS — I35.0 AORTIC VALVE STENOSIS, ETIOLOGY OF CARDIAC VALVE DISEASE UNSPECIFIED: ICD-10-CM

## 2019-03-25 DIAGNOSIS — I50.42 CHRONIC COMBINED SYSTOLIC AND DIASTOLIC HEART FAILURE (HCC): Primary | ICD-10-CM

## 2019-03-25 DIAGNOSIS — R05.9 COUGH: ICD-10-CM

## 2019-03-25 DIAGNOSIS — I10 ESSENTIAL HYPERTENSION: ICD-10-CM

## 2019-03-25 PROCEDURE — G8484 FLU IMMUNIZE NO ADMIN: HCPCS | Performed by: NURSE PRACTITIONER

## 2019-03-25 PROCEDURE — 1036F TOBACCO NON-USER: CPT | Performed by: NURSE PRACTITIONER

## 2019-03-25 PROCEDURE — G8427 DOCREV CUR MEDS BY ELIG CLIN: HCPCS | Performed by: NURSE PRACTITIONER

## 2019-03-25 PROCEDURE — 4040F PNEUMOC VAC/ADMIN/RCVD: CPT | Performed by: NURSE PRACTITIONER

## 2019-03-25 PROCEDURE — 1111F DSCHRG MED/CURRENT MED MERGE: CPT | Performed by: NURSE PRACTITIONER

## 2019-03-25 PROCEDURE — G8417 CALC BMI ABV UP PARAM F/U: HCPCS | Performed by: NURSE PRACTITIONER

## 2019-03-25 PROCEDURE — G8598 ASA/ANTIPLAT THER USED: HCPCS | Performed by: NURSE PRACTITIONER

## 2019-03-25 PROCEDURE — 1101F PT FALLS ASSESS-DOCD LE1/YR: CPT | Performed by: NURSE PRACTITIONER

## 2019-03-25 PROCEDURE — 99214 OFFICE O/P EST MOD 30 MIN: CPT | Performed by: NURSE PRACTITIONER

## 2019-03-25 PROCEDURE — 1123F ACP DISCUSS/DSCN MKR DOCD: CPT | Performed by: NURSE PRACTITIONER

## 2019-03-25 RX ORDER — FUROSEMIDE 40 MG/1
60 TABLET ORAL 2 TIMES DAILY
Qty: 90 TABLET | Refills: 1 | Status: SHIPPED | OUTPATIENT
Start: 2019-03-25 | End: 2019-04-04

## 2019-03-25 RX ORDER — AMLODIPINE BESYLATE 10 MG/1
TABLET ORAL
Qty: 90 TABLET | Refills: 1 | Status: SHIPPED | OUTPATIENT
Start: 2019-03-25 | End: 2019-10-14

## 2019-03-25 RX ORDER — LOSARTAN POTASSIUM 25 MG/1
12.5 TABLET ORAL DAILY
Qty: 30 TABLET | Refills: 1 | Status: SHIPPED | OUTPATIENT
Start: 2019-03-25 | End: 2019-06-24 | Stop reason: SDUPTHER

## 2019-03-29 ENCOUNTER — HOSPITAL ENCOUNTER (OUTPATIENT)
Age: 84
Discharge: HOME | End: 2019-03-29
Payer: MEDICARE

## 2019-03-29 LAB
ANION GAP SERPL CALCULATED.3IONS-SCNC: 18 MMOL/L (ref 3–16)
BUN BLDV-MCNC: 43 MG/DL (ref 7–20)
CALCIUM SERPL-MCNC: 9.2 MG/DL (ref 8.3–10.6)
CHLORIDE BLD-SCNC: 95 MMOL/L (ref 99–110)
CO2: 21 MMOL/L (ref 21–32)
CREAT SERPL-MCNC: 1.6 MG/DL (ref 0.8–1.3)
GFR AFRICAN AMERICAN: 49
GFR NON-AFRICAN AMERICAN: 41
GLUCOSE BLD-MCNC: 145 MG/DL (ref 70–99)
POTASSIUM SERPL-SCNC: 4.4 MMOL/L (ref 3.5–5.1)
PRO-BNP: 599 PG/ML (ref 0–449)
SODIUM BLD-SCNC: 134 MMOL/L (ref 136–145)

## 2019-03-29 PROCEDURE — 80048 BASIC METABOLIC PNL TOTAL CA: CPT

## 2019-03-29 PROCEDURE — 83880 ASSAY OF NATRIURETIC PEPTIDE: CPT

## 2019-03-29 PROCEDURE — 36415 COLL VENOUS BLD VENIPUNCTURE: CPT

## 2019-04-04 ENCOUNTER — HOSPITAL ENCOUNTER (OUTPATIENT)
Age: 84
Setting detail: SPECIMEN
Discharge: HOME | End: 2019-04-04
Payer: MEDICARE

## 2019-04-04 DIAGNOSIS — I50.42 CHRONIC COMBINED SYSTOLIC AND DIASTOLIC HEART FAILURE (HCC): Primary | ICD-10-CM

## 2019-04-04 LAB
ANION GAP SERPL CALCULATED.3IONS-SCNC: 18 MMOL/L (ref 3–16)
BUN BLDV-MCNC: 38 MG/DL (ref 7–20)
CALCIUM SERPL-MCNC: 9.4 MG/DL (ref 8.3–10.6)
CHLORIDE BLD-SCNC: 95 MMOL/L (ref 99–110)
CO2: 22 MMOL/L (ref 21–32)
CREAT SERPL-MCNC: 1.6 MG/DL (ref 0.8–1.3)
GFR AFRICAN AMERICAN: 49
GFR NON-AFRICAN AMERICAN: 41
GLUCOSE BLD-MCNC: 102 MG/DL (ref 70–99)
POTASSIUM SERPL-SCNC: 3.7 MMOL/L (ref 3.5–5.1)
PRO-BNP: 605 PG/ML (ref 0–449)
SODIUM BLD-SCNC: 135 MMOL/L (ref 136–145)

## 2019-04-04 PROCEDURE — 80048 BASIC METABOLIC PNL TOTAL CA: CPT

## 2019-04-04 PROCEDURE — 36415 COLL VENOUS BLD VENIPUNCTURE: CPT

## 2019-04-04 PROCEDURE — 83880 ASSAY OF NATRIURETIC PEPTIDE: CPT

## 2019-04-04 RX ORDER — FUROSEMIDE 40 MG/1
40 TABLET ORAL 2 TIMES DAILY
Qty: 90 TABLET | Refills: 1
Start: 2019-04-04 | End: 2019-10-14

## 2019-04-05 ENCOUNTER — TELEPHONE (OUTPATIENT)
Dept: CARDIOLOGY CLINIC | Age: 84
End: 2019-04-05

## 2019-04-05 NOTE — TELEPHONE ENCOUNTER
Spoke with pt's son. Relayed Results and instructions as directed    LMOM for Teagan Ayala at [facility] regarding blood draw in one week. Pt's son reports that cough has improved since taking Losartan instead of Lisinopril.

## 2019-04-11 ENCOUNTER — HOSPITAL ENCOUNTER (OUTPATIENT)
Age: 84
Setting detail: SPECIMEN
Discharge: HOME | End: 2019-04-11
Payer: MEDICARE

## 2019-04-11 LAB
ANION GAP SERPL CALCULATED.3IONS-SCNC: 17 MMOL/L (ref 3–16)
BUN BLDV-MCNC: 36 MG/DL (ref 7–20)
CALCIUM SERPL-MCNC: 9.3 MG/DL (ref 8.3–10.6)
CHLORIDE BLD-SCNC: 96 MMOL/L (ref 99–110)
CO2: 22 MMOL/L (ref 21–32)
CREAT SERPL-MCNC: 1.4 MG/DL (ref 0.8–1.3)
GFR AFRICAN AMERICAN: 58
GFR NON-AFRICAN AMERICAN: 48
GLUCOSE BLD-MCNC: 178 MG/DL (ref 70–99)
POTASSIUM SERPL-SCNC: 3.7 MMOL/L (ref 3.5–5.1)
PRO-BNP: 716 PG/ML (ref 0–449)
SODIUM BLD-SCNC: 135 MMOL/L (ref 136–145)

## 2019-04-11 PROCEDURE — 36415 COLL VENOUS BLD VENIPUNCTURE: CPT

## 2019-04-11 PROCEDURE — 83880 ASSAY OF NATRIURETIC PEPTIDE: CPT

## 2019-04-11 PROCEDURE — 80048 BASIC METABOLIC PNL TOTAL CA: CPT

## 2019-04-15 ENCOUNTER — TELEPHONE (OUTPATIENT)
Dept: CARDIOLOGY CLINIC | Age: 84
End: 2019-04-15

## 2019-04-15 NOTE — TELEPHONE ENCOUNTER
----- Message from NIKHIL Uribe CNP sent at 4/13/2019 10:38 PM EDT -----  Notify patient labs show improvement in kidney numbers and BNP for fluid stable. Continue current medications.

## 2019-04-22 ENCOUNTER — TELEPHONE (OUTPATIENT)
Dept: CARDIOLOGY CLINIC | Age: 84
End: 2019-04-22

## 2019-04-22 NOTE — TELEPHONE ENCOUNTER
PT called stating that he did not want to keep his HF appointment w Dione Conner tomorrow. Highly suggested that he keep the appointment so that we can keep him out of HF and out of the hospital. In the end, he states that he would keep that appointment.  Nick PINTO

## 2019-04-23 ENCOUNTER — NURSE ONLY (OUTPATIENT)
Dept: CARDIOLOGY CLINIC | Age: 84
End: 2019-04-23
Payer: MEDICARE

## 2019-04-23 ENCOUNTER — OFFICE VISIT (OUTPATIENT)
Dept: CARDIOLOGY CLINIC | Age: 84
End: 2019-04-23
Payer: MEDICARE

## 2019-04-23 VITALS
SYSTOLIC BLOOD PRESSURE: 146 MMHG | HEIGHT: 70 IN | DIASTOLIC BLOOD PRESSURE: 60 MMHG | RESPIRATION RATE: 17 BRPM | WEIGHT: 258 LBS | OXYGEN SATURATION: 95 % | BODY MASS INDEX: 36.94 KG/M2 | HEART RATE: 65 BPM

## 2019-04-23 DIAGNOSIS — I25.10 CORONARY ARTERY DISEASE INVOLVING NATIVE CORONARY ARTERY OF NATIVE HEART WITHOUT ANGINA PECTORIS: ICD-10-CM

## 2019-04-23 DIAGNOSIS — Z95.0 CARDIAC PACEMAKER: ICD-10-CM

## 2019-04-23 DIAGNOSIS — G47.33 OSA (OBSTRUCTIVE SLEEP APNEA): ICD-10-CM

## 2019-04-23 DIAGNOSIS — I10 ESSENTIAL HYPERTENSION: ICD-10-CM

## 2019-04-23 DIAGNOSIS — I42.9 CARDIOMYOPATHY, UNSPECIFIED TYPE (HCC): ICD-10-CM

## 2019-04-23 DIAGNOSIS — I50.42 CHRONIC COMBINED SYSTOLIC AND DIASTOLIC HEART FAILURE (HCC): Primary | ICD-10-CM

## 2019-04-23 DIAGNOSIS — I35.0 AORTIC VALVE STENOSIS, ETIOLOGY OF CARDIAC VALVE DISEASE UNSPECIFIED: ICD-10-CM

## 2019-04-23 DIAGNOSIS — R00.1 BRADYCARDIA: ICD-10-CM

## 2019-04-23 PROCEDURE — 99214 OFFICE O/P EST MOD 30 MIN: CPT | Performed by: NURSE PRACTITIONER

## 2019-04-23 PROCEDURE — G8417 CALC BMI ABV UP PARAM F/U: HCPCS | Performed by: NURSE PRACTITIONER

## 2019-04-23 PROCEDURE — G8598 ASA/ANTIPLAT THER USED: HCPCS | Performed by: NURSE PRACTITIONER

## 2019-04-23 PROCEDURE — 1123F ACP DISCUSS/DSCN MKR DOCD: CPT | Performed by: NURSE PRACTITIONER

## 2019-04-23 PROCEDURE — G8427 DOCREV CUR MEDS BY ELIG CLIN: HCPCS | Performed by: NURSE PRACTITIONER

## 2019-04-23 PROCEDURE — 1036F TOBACCO NON-USER: CPT | Performed by: NURSE PRACTITIONER

## 2019-04-23 PROCEDURE — 93296 REM INTERROG EVL PM/IDS: CPT | Performed by: INTERNAL MEDICINE

## 2019-04-23 PROCEDURE — 93294 REM INTERROG EVL PM/LDLS PM: CPT | Performed by: INTERNAL MEDICINE

## 2019-04-23 PROCEDURE — 4040F PNEUMOC VAC/ADMIN/RCVD: CPT | Performed by: NURSE PRACTITIONER

## 2019-04-23 NOTE — LETTER
[address] [facility] [phone]  [place]- [phone]  [address]  [facility]    Pacemaker/Defibrillator Clinic        04/25/19        Agueda Nassar  [address] [facility] [address] [place] [address]        Dear Agueda Nassar      This letter is to inform you that we received a transmission from your monitor at home that checks your pacemaker and/or defibrillator, or implanted heart monitor. Your next scheduled transmission date is 7/30/19. You will receive a reminder call 1-3 days beforehand. Please remember to transmit only on your scheduled date and only once. Please be aware that the remote device transmission sites are periodically monitored only during regular business hours during which simultaneous in-office device clinics are being run. If your transmission requires attention, we will contact you as soon as possible. Please also keep in mind that implanted devices should be checked in the office manually once per year. Please call our office if you need to schedule this visit. Thank you.                  [address]

## 2019-04-23 NOTE — PROGRESS NOTES
[address] [facility] [place] - Cardiology      Chief Complaint: \"doing okay\"    Chief Complaint   Patient presents with    1 Month Follow-Up     Denies cardiac symptoms. Seems confused    Congestive Heart Failure    Coronary Artery Disease    Hypertension    Sleep Apnea       History of present illness: Heather Anderson is an 80 y.o. male with past medical history significant for chronic combined s/dCHF, HTN, DM, CAD, ABDI on cpap, AS s/p TAVR 6/2018. C 5/2018 with non obstructive CAD. He was hospitalized 3/15-3/18 after presenting with weakness and fatigue, treated for HF exacerbation possibly secondary to indiscretion with dietary and fluids. DC weight 261 lbs. He is active with [facility] Lisinopril changed to losartan at last office visit secondary to cough. Patient is here today for follow up chronic combined s/dCHF. He presents to office ambulatory. He is dismayed that he showed up for appointment 90 minutes early, says he was told the wrong time on the phone. Overall, doing better, cough has improved with switch to losartan, still gets a \"little smothery\" at times during the night. Sleeps flat in bed on 1 pillow. Denies shortness of breath ambulating into office today. Office weight down 6 lbs compared to last visit. Currently taking Lasix 40 mg BID. Recent labs (4/11) with creat 1.4. Reports compliance with cpap but has not seen sleep medicine for years.        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    Obesity     Prostate CA ([facility] 4/21/2015    Prostate cancer ([facility]     SOB (shortness of breath) on exertion     Unspecified sleep apnea     uses cpap     Past Surgical History:   Procedure Laterality Date    APPENDECTOMY      CHOLECYSTECTOMY, LAPAROSCOPIC  12/30/2011    WITH CHOLANGIOGRAM    COLONOSCOPY      COSMETIC SURGERY      FRACTURE SURGERY      HERNIA REPAIR      JOINT REPLACEMENT      left knee    MYRINGOTOMY Left 8/12/13    LEFT INSERTION OF PE TUBE LEFT, NASOPHARYNGOSCOPY    PTCA      SHOULDER ARTHROPLASTY      LT    TYMPANOSTOMY TUBE PLACEMENT  [id number]    MYRINGOTOMY   INSERTION OF PE TUBE -LEFT,EVALUATION OF     Social History     Tobacco Use    Smoking status: Never Smoker    Smokeless tobacco: Never Used   Substance Use Topics    Alcohol use: No       Review of Systems:   Constitutional: No significant change in weight, + fatigue, no weakness. HEENT: No change in vision or ringing in the ears. Respiratory: No BILLS, + PND (at times), no orthopnea, cough. Cardiovascular: No chest pain, palpitations, + edema (improved). GI: No n/v, abdominal pain or changes in bowel habits. No melena, no hematochezia  : No dysuria or hematuria. Skin: No rash or new skin lesions. Musculoskeletal: No new muscle or joint pain. Neurological: No lightheadedness, dizziness, syncope or TIA-like symptoms. Psychiatric: No anxiety, insomnia or depression    Physical Exam:  BP (!) 164/66 (Site: Left Upper Arm, Position: Sitting, Cuff Size: Medium Adult)   Pulse 65   Resp 17   Ht 5' 10\" (1.778 m)   Wt 258 lb (117 kg)   SpO2 95%   BMI 37.02 kg/m²     General:  Awake, alert, oriented in NAD  Skin:  Warm and dry. HEENT: Normocephalic and atraumatic. Oral mucosa moist, no cyanosis. Neck:  Supple. No JVP or carotid bruit appreciated  Chest:  Normal effort.   Diminished but clear  Cardiovascular:  RRR, F8/D5, + systolic murmur, no gallop/rub  Abdomen:  Soft, nontender, +bowel sounds  Extremities:  Trace BLE edema  Neurological: No focal deficits  Psychological: Normal mood and affect    Home Medications:  Current Outpatient Medications   Medication Sig Dispense Refill    furosemide (LASIX) 40 MG tablet Take 1 tablet by mouth 2 times daily 90 tablet 1    amLODIPine (NORVASC) 10 MG tablet TAKE ONE TABLET BY MOUTH EVERY DAY 90 tablet 1    losartan (COZAAR) 25 MG tablet Take 0.5 tablets by mouth daily 30 tablet 1    spironolactone (ALDACTONE) 25 MG tablet Take 1 tablet by mouth daily 30 tablet 3    clopidogrel (PLAVIX) 75 MG tablet TAKE 1 TABLET BY MOUTH EVERY DAY 90 tablet 0    azelastine (ASTELIN) 0.1 % nasal spray 2 sprays by Nasal route 2 times daily Use in each nostril as directed 1 Bottle 1    famotidine (PEPCID) 20 MG tablet TAKE 1 TABLET BY MOUTH TWICE DAILY 180 tablet 3    fluticasone (FLONASE) 50 MCG/ACT nasal spray 1 spray by Nasal route daily 1 Bottle 1    budesonide-formoterol (SYMBICORT) 160-4.5 MCG/ACT AERO Inhale 2 puffs into the lungs 2 times daily      glipiZIDE (GLUCOTROL XL) 2.5 MG extended release tablet Take 2.5 mg by mouth daily      metoprolol tartrate (LOPRESSOR) 25 MG tablet Take 12.5 mg by mouth 2 times daily       aspirin 81 MG tablet Take 81 mg by mouth daily      pravastatin (PRAVACHOL) 40 MG tablet TAKE ONE TABLET BY MOUTH EVERY DAY AT BEDTIME 90 tablet 3    gemfibrozil (LOPID) 600 MG tablet TAKE ONE TABLET BY MOUTH TWICE DAILY 180 tablet 2     No current facility-administered medications for this visit.         Labs:   Lab Results   Component Value Date    WBC 7.0 03/15/2019    HGB 13.6 03/15/2019    HCT 39.3 (L) 03/15/2019    MCV 86.8 03/15/2019     03/15/2019     Lab Results   Component Value Date     04/11/2019    K 3.7 04/11/2019    K 5.4 06/13/2018    CL 96 04/11/2019    CO2 22 04/11/2019    BUN 36 04/11/2019    CREATININE 1.4 04/11/2019    GLUCOSE 178 04/11/2019    CALCIUM 9.3 04/11/2019      Lab Results   Component Value Date    TRIG 337 03/18/2019    HDL 29 03/18/2019    HDL 39 11/21/2011    LDLCALC see below 03/18/2019 LDLDIRECT 77 03/18/2019    LABVLDL see below 03/18/2019       Diagnostics:    Echo 3/18/2019:  Summary   - S/P TAVR 6/12/2018.   -Left ventricular cavity size is normal with moderately severe concentric left ventricular hypertrophy.   -Overall left ventricular systolic function appears moderately reduced with an ejection fraction on the 30-35% range.   -There is abnormal septal motion possible due to RV pacing.   -Grade I diastolic dysfunction with normal LV filling pressures.   -Mitral annular calcification is present.   -Mild mitral regurgitation.   -A bioprosthetic artificial aortic valve appears well seated with a maximum   velocity of 2.7m/s and a mean gradient of 16mmHg.   -Mild tricuspid regurgitation.   -The left atrium is mildly dilated. Echo 7/18/2018:  Summary   -Left ventricular cavity size is normal.   -There is severe concentric left ventricular hypertrophy.   -Overall left ventricular systolic function appears moderately reduced with   an ejection fraction on the 35-40% range.   -Abnormal (paradoxical) septal motion is present.   -Grade II diastolic dysfunction with elevated LV filling pressures. E/e\"=17.4   -Mitral annular calcification is present. -Mild to moderate mitral regurgitation.   -A bioprosthetic artificial aortic valve appears well seated with a maximum   velocity of 2.43m/s and a mean gradient of 12mmHg. -Mild tricuspid regurgitation with RVSP of 38 mmHg.   -Dilated left atrium with a volume of 80 ml.   -The right ventricle is mildly enlarged.   -Pacer / ICD wire is visualized in the right heart.   -The right atrium appears mildly to moderately dilated.      Echo 3/13/2018:  Summary   - S/P TAVR 6/12/2018.   -Left ventricular cavity size is normal with moderately severe concentric left ventricular hypertrophy.   -Overall left ventricular systolic function appears moderately reduced with   an ejection fraction on the 30-35% range.   -There is abnormal septal motion possible due to RV pacing.   -Grade I diastolic dysfunction with normal LV filling pressures. -Mitral annular calcification is present. -Mild mitral regurgitation.   -A bioprosthetic artificial aortic valve appears well seated with a maximum   velocity of 2.7m/s and a mean gradient of 16mmHg. -Mild tricuspid regurgitation.   -The left atrium is mildly dilated. [facility] 5/15/2018: Findings:                      LM       Normal              LAD     50% mid               Cx        50% mid              RCA     50% mid              LVG     Not performed              EDP     Not obtained  Intervention:  None      Assessment:  1. Chronic combined systolic and diastolic heart failure. NYHA class II. Appears stable. 2. Cardiomyopathy. EF 30-35%. On ARB and evidence based beta blocker. On lopressor. Consider transition to evidence based beta blocker on follow up. 3. Coronary artery disease. [facility] 5/2018 with non-obstructive CAD. Denies chest pain. On DAPT, beta blocker and statin. 4. Aortic valve stenosis s/p TAVR 6/2018. Well seated on echo 7/2018.     5. Essential hypertension. Reasonably controlled. With HR, goal < 130/80.    6. ABDI (obstructive sleep apnea). Compliant with CPAP but has not seen sleep medicine in many years. Plan:  1. Continue current medications. 2. Recheck labs in 1 month. 3. Continue to limit salt in diet (no added salt) and fluids to 64 ounces. 4. Follow up in 3 months, earlier for worsening. 5. Discuss with patient transition to evidence based beta blocker. Thank you for allowing me to participate in the care of your patient.     NIKHIL Quinn-CNP

## 2019-04-24 ENCOUNTER — TELEPHONE (OUTPATIENT)
Dept: CARDIOLOGY CLINIC | Age: 84
End: 2019-04-24

## 2019-04-25 NOTE — PROGRESS NOTES
Carelink transmission shows normal device function. Battery and lead function stable. Brief NSVT, no AT/AF. See Paceart report under cardiology tab. Recheck 3 mos.  mk

## 2019-05-13 ENCOUNTER — OFFICE VISIT (OUTPATIENT)
Dept: CARDIOLOGY CLINIC | Age: 84
End: 2019-05-13
Payer: MEDICARE

## 2019-05-13 ENCOUNTER — TELEPHONE (OUTPATIENT)
Dept: OTHER | Age: 84
End: 2019-05-13

## 2019-05-13 ENCOUNTER — TELEPHONE (OUTPATIENT)
Dept: CARDIOLOGY CLINIC | Age: 84
End: 2019-05-13

## 2019-05-13 ENCOUNTER — HOSPITAL ENCOUNTER (OUTPATIENT)
Age: 84
Discharge: HOME | End: 2019-05-13
Payer: MEDICARE

## 2019-05-13 VITALS
HEIGHT: 70 IN | RESPIRATION RATE: 17 BRPM | WEIGHT: 262 LBS | HEART RATE: 60 BPM | SYSTOLIC BLOOD PRESSURE: 136 MMHG | DIASTOLIC BLOOD PRESSURE: 62 MMHG | BODY MASS INDEX: 37.51 KG/M2 | OXYGEN SATURATION: 95 %

## 2019-05-13 DIAGNOSIS — G47.33 OSA (OBSTRUCTIVE SLEEP APNEA): ICD-10-CM

## 2019-05-13 DIAGNOSIS — Z95.2 S/P TAVR (TRANSCATHETER AORTIC VALVE REPLACEMENT): ICD-10-CM

## 2019-05-13 DIAGNOSIS — I25.10 CORONARY ARTERY DISEASE INVOLVING NATIVE CORONARY ARTERY OF NATIVE HEART WITHOUT ANGINA PECTORIS: ICD-10-CM

## 2019-05-13 DIAGNOSIS — I50.42 CHRONIC COMBINED SYSTOLIC AND DIASTOLIC HEART FAILURE (HCC): ICD-10-CM

## 2019-05-13 DIAGNOSIS — I42.9 CARDIOMYOPATHY, UNSPECIFIED TYPE (HCC): ICD-10-CM

## 2019-05-13 DIAGNOSIS — I10 ESSENTIAL HYPERTENSION: ICD-10-CM

## 2019-05-13 DIAGNOSIS — I50.42 CHRONIC COMBINED SYSTOLIC AND DIASTOLIC HEART FAILURE (HCC): Primary | ICD-10-CM

## 2019-05-13 LAB
ANION GAP SERPL CALCULATED.3IONS-SCNC: 14 MMOL/L (ref 3–16)
BUN BLDV-MCNC: 28 MG/DL (ref 7–20)
CALCIUM SERPL-MCNC: 10.4 MG/DL (ref 8.3–10.6)
CHLORIDE BLD-SCNC: 98 MMOL/L (ref 99–110)
CO2: 25 MMOL/L (ref 21–32)
CREAT SERPL-MCNC: 1.2 MG/DL (ref 0.8–1.3)
GFR AFRICAN AMERICAN: >60
GFR NON-AFRICAN AMERICAN: 57
GLUCOSE BLD-MCNC: 149 MG/DL (ref 70–99)
POTASSIUM SERPL-SCNC: 4.1 MMOL/L (ref 3.5–5.1)
PRO-BNP: 607 PG/ML (ref 0–449)
SODIUM BLD-SCNC: 137 MMOL/L (ref 136–145)

## 2019-05-13 PROCEDURE — 80048 BASIC METABOLIC PNL TOTAL CA: CPT

## 2019-05-13 PROCEDURE — 99214 OFFICE O/P EST MOD 30 MIN: CPT | Performed by: CLINICAL NURSE SPECIALIST

## 2019-05-13 PROCEDURE — 1123F ACP DISCUSS/DSCN MKR DOCD: CPT | Performed by: CLINICAL NURSE SPECIALIST

## 2019-05-13 PROCEDURE — 1036F TOBACCO NON-USER: CPT | Performed by: CLINICAL NURSE SPECIALIST

## 2019-05-13 PROCEDURE — 83880 ASSAY OF NATRIURETIC PEPTIDE: CPT

## 2019-05-13 PROCEDURE — 4040F PNEUMOC VAC/ADMIN/RCVD: CPT | Performed by: CLINICAL NURSE SPECIALIST

## 2019-05-13 PROCEDURE — 36415 COLL VENOUS BLD VENIPUNCTURE: CPT

## 2019-05-13 PROCEDURE — G8427 DOCREV CUR MEDS BY ELIG CLIN: HCPCS | Performed by: CLINICAL NURSE SPECIALIST

## 2019-05-13 PROCEDURE — G8598 ASA/ANTIPLAT THER USED: HCPCS | Performed by: CLINICAL NURSE SPECIALIST

## 2019-05-13 PROCEDURE — G8417 CALC BMI ABV UP PARAM F/U: HCPCS | Performed by: CLINICAL NURSE SPECIALIST

## 2019-05-13 RX ORDER — POTASSIUM CHLORIDE 750 MG/1
10 CAPSULE, EXTENDED RELEASE ORAL DAILY
COMMUNITY
End: 2020-05-11

## 2019-05-13 RX ORDER — OMEPRAZOLE 20 MG/1
20 CAPSULE, DELAYED RELEASE ORAL DAILY
Refills: 0 | COMMUNITY
Start: 2019-03-27

## 2019-05-13 RX ORDER — M-VIT,TX,IRON,MINS/CALC/FOLIC 27MG-0.4MG
1 TABLET ORAL DAILY
COMMUNITY

## 2019-05-13 RX ORDER — CARVEDILOL 12.5 MG/1
12.5 TABLET ORAL 2 TIMES DAILY
Qty: 60 TABLET | Refills: 1 | Status: SHIPPED | OUTPATIENT
Start: 2019-05-13 | End: 2019-05-13 | Stop reason: SDUPTHER

## 2019-05-13 NOTE — PATIENT INSTRUCTIONS
1. Check blood work today  2. Continue all current medications  3. Will call sleep medicine to see if appt can be earlier and they will call  4. Stop lopressor and change to coreg 12.5 mg twice a day  5. Please call PCP for an appt regarding depression  6.   Fluid restrictions of 64 ounces and low sodium diet of 2000 mg a day

## 2019-05-13 NOTE — TELEPHONE ENCOUNTER
Spoke with Justin Domingo with sleep. She will speak to Dr. Ankush Haynes about getting himgi in sooner for an appointment and call patient.

## 2019-05-14 ENCOUNTER — TELEPHONE (OUTPATIENT)
Dept: CARDIOLOGY CLINIC | Age: 84
End: 2019-05-14

## 2019-05-14 ENCOUNTER — OFFICE VISIT (OUTPATIENT)
Dept: PULMONOLOGY | Age: 84
End: 2019-05-14
Payer: MEDICARE

## 2019-05-14 VITALS
SYSTOLIC BLOOD PRESSURE: 132 MMHG | BODY MASS INDEX: 37.37 KG/M2 | OXYGEN SATURATION: 94 % | WEIGHT: 261 LBS | HEIGHT: 70 IN | HEART RATE: 60 BPM | DIASTOLIC BLOOD PRESSURE: 78 MMHG

## 2019-05-14 DIAGNOSIS — G47.33 OSA (OBSTRUCTIVE SLEEP APNEA): Primary | ICD-10-CM

## 2019-05-14 DIAGNOSIS — E66.9 NON MORBID OBESITY, UNSPECIFIED OBESITY TYPE: Chronic | ICD-10-CM

## 2019-05-14 DIAGNOSIS — G47.00 INSOMNIA, UNSPECIFIED TYPE: ICD-10-CM

## 2019-05-14 DIAGNOSIS — I25.83 CORONARY ARTERY DISEASE DUE TO LIPID RICH PLAQUE: Chronic | ICD-10-CM

## 2019-05-14 DIAGNOSIS — I25.10 CORONARY ARTERY DISEASE DUE TO LIPID RICH PLAQUE: Chronic | ICD-10-CM

## 2019-05-14 DIAGNOSIS — I10 ESSENTIAL HYPERTENSION: Chronic | ICD-10-CM

## 2019-05-14 DIAGNOSIS — I50.42 CHRONIC COMBINED SYSTOLIC AND DIASTOLIC CONGESTIVE HEART FAILURE (HCC): Chronic | ICD-10-CM

## 2019-05-14 PROCEDURE — G8417 CALC BMI ABV UP PARAM F/U: HCPCS | Performed by: INTERNAL MEDICINE

## 2019-05-14 PROCEDURE — 1123F ACP DISCUSS/DSCN MKR DOCD: CPT | Performed by: INTERNAL MEDICINE

## 2019-05-14 PROCEDURE — 1036F TOBACCO NON-USER: CPT | Performed by: INTERNAL MEDICINE

## 2019-05-14 PROCEDURE — G8598 ASA/ANTIPLAT THER USED: HCPCS | Performed by: INTERNAL MEDICINE

## 2019-05-14 PROCEDURE — 99204 OFFICE O/P NEW MOD 45 MIN: CPT | Performed by: INTERNAL MEDICINE

## 2019-05-14 PROCEDURE — G8427 DOCREV CUR MEDS BY ELIG CLIN: HCPCS | Performed by: INTERNAL MEDICINE

## 2019-05-14 PROCEDURE — 4040F PNEUMOC VAC/ADMIN/RCVD: CPT | Performed by: INTERNAL MEDICINE

## 2019-05-14 RX ORDER — TRAZODONE HYDROCHLORIDE 50 MG/1
TABLET ORAL
Qty: 90 TABLET | Refills: 2 | Status: SHIPPED | OUTPATIENT
Start: 2019-05-14 | End: 2019-10-14

## 2019-05-14 ASSESSMENT — SLEEP AND FATIGUE QUESTIONNAIRES
HOW LIKELY ARE YOU TO NOD OFF OR FALL ASLEEP WHEN YOU ARE A PASSENGER IN A CAR FOR AN HOUR WITHOUT A BREAK: 0
HOW LIKELY ARE YOU TO NOD OFF OR FALL ASLEEP WHILE WATCHING TV: 2
HOW LIKELY ARE YOU TO NOD OFF OR FALL ASLEEP WHILE SITTING AND TALKING TO SOMEONE: 0
NECK CIRCUMFERENCE (INCHES): 21
HOW LIKELY ARE YOU TO NOD OFF OR FALL ASLEEP IN A CAR, WHILE STOPPED FOR A FEW MINUTES IN TRAFFIC: 0
HOW LIKELY ARE YOU TO NOD OFF OR FALL ASLEEP WHILE LYING DOWN TO REST IN THE AFTERNOON WHEN CIRCUMSTANCES PERMIT: 1
HOW LIKELY ARE YOU TO NOD OFF OR FALL ASLEEP WHILE SITTING INACTIVE IN A PUBLIC PLACE: 0
HOW LIKELY ARE YOU TO NOD OFF OR FALL ASLEEP WHILE SITTING AND READING: 0
ESS TOTAL SCORE: 4
HOW LIKELY ARE YOU TO NOD OFF OR FALL ASLEEP WHILE SITTING QUIETLY AFTER LUNCH WITHOUT ALCOHOL: 1

## 2019-05-14 ASSESSMENT — ENCOUNTER SYMPTOMS
SHORTNESS OF BREATH: 0
ALLERGIC/IMMUNOLOGIC NEGATIVE: 1
CHOKING: 0
ABDOMINAL DISTENTION: 0
CHEST TIGHTNESS: 0
NAUSEA: 0
PHOTOPHOBIA: 0
RHINORRHEA: 0
ABDOMINAL PAIN: 0
EYE PAIN: 0
VOMITING: 0
APNEA: 0

## 2019-05-14 NOTE — TELEPHONE ENCOUNTER
Call placed to patient who was not available to speak with. Called and spoke with Merle Quispe III he states that he is not sure what is going on with the patient. He will check with him later this morning and call us back to let us know. As far as he knows he has not contacted his PCP.

## 2019-05-14 NOTE — PROGRESS NOTES
Sharon Reaves MD, FAASM, [facility]  [facility]  [facility]  3rd Floor,  [address] [facility], [address] [phone]   [address] [place] [address] Dr Starkey . Sem[address] Castillo [address] [phone]     [address] [place] [id number] [phone]    Assessment:      Visit Diagnoses and Associated Orders     ABDI (obstructive sleep apnea)   (New Problem)  -  Primary    On Tx         Coronary artery disease due to lipid rich plaque   (Stable)           Chronic combined systolic and diastolic congestive heart failure (HCC)   (Stable)           Essential hypertension   (Stable)           Non morbid obesity, unspecified obesity type   (Stable)           Insomnia, unspecified type   (New Problem)      Needs Tx                  Plan:      Reviewed old records, pertinent data listed. Reviewed compliance download with pt. Supplies and parts as needed for his machine. These are medically necessary. Continue medications per his PCP and other physicians. Limit caffeine use after 3pm.  Encouraged him to work on weight loss through diet and exercise. The primary encounter diagnosis was ABDI (obstructive sleep apnea). Diagnoses of Coronary artery disease due to lipid rich plaque, Chronic combined systolic and diastolic congestive heart failure ([address] Essential hypertension, and Non morbid obesity, unspecified obesity type were also pertinent to this visit. The chronic medical conditions listed are directly related to the primary diagnosis listed above. The management of the primary diagnosis affects the secondary diagnosis and vice versa. Will place machine in auto mode, may need in lab titration. Will do trial of trazodone for sleep. Continue meds for: CHF, CAD, and HTN. Pt would medically benefit from wt loss for ABDI (diet, exercise, surgical).     Orders Placed This Encounter   Medications    traZODone (DESYREL) 50 MG tablet     Si/2-3 po qHS, start 1/2 pill qHS, can increase by 1/2 pill every 7 nights if still not sleeping to a max of 3 pills po qHS     Dispense:  90 tablet     Refill:  2          Subjective:     Patient ID: Rebecca Sebastian is a 80 y.o. male. Chief Complaint   Patient presents with    Sleep Apnea       HPI:      Rebecca Sebastian is a 80 y.o. male referred by Ian Egan, APR* for a sleep evaluation. He complains of: snoring, witnessed apneas, excessive daytime sleepiness , non-restorative sleep, nocturia and tossing and turning at night. He denies: cataplexy and hypnagogic hallucinations. Symptoms began >20 years ago, controlled at first on bilevel but in the last year is not sleeping well, cannot fall asleep, and not waking rested. Hard to shut his mind down, keeps racing at night. Coronary artery disease, hypertension, congestive heart failure, and obesity: stable on meds and followed by pt's PCP and other physicians. Previous evaluation and treatment has included- Reviewed polysomngraphy done at [facility] on 01 showing AHI-55.2/hr with low sat-81%. Was first placed on CPAP and failed and then changed to bilevel. Had bilevel titration at [facility] on 13. Machine Modem/Download Info:  Compliance (hours/night): 15 hrs/night  Download AHI (/hour): 6.5 /HR BILEVEL - Settings  IPAP: 11 cmH2O  EPAP: 7 cmH2O    Comfort Settings  Humidity Level (0-8): 0  Heated Tubing (Yes/No): Yes PAP Mask  Mask Type: Nasal mask  Mask Model: Res med  Mask Size: std     Last EF-30% on 3/19/19    DOT/CDL - No  FAA/'s license -No    Previous Report(s) Reviewed: historical medical records, office notes, andreferral letter(s). Pertinent data has been documented.     Rueter - Total score: 4    Caffeine Intake - Coffee: 3 cup(s) per day    Social History     Socioeconomic History    Marital status:      Spouse name: Not on file    Number of children: Not on file  Years of education: Not on file    Highest education level: Not on file   Occupational History     Employer: RETIRED   Social Needs    Financial resource strain: Not on file    Food insecurity:     Worry: Not on file     Inability: Not on file    Transportation needs:     Medical: Not on file     Non-medical: Not on file   Tobacco Use    Smoking status: Never Smoker    Smokeless tobacco: Never Used   Substance and Sexual Activity    Alcohol use: No    Drug use: No    Sexual activity: Not Currently   Lifestyle    Physical activity:     Days per week: Not on file     Minutes per session: Not on file    Stress: Not on file   Relationships    Social connections:     Talks on phone: Not on file     Gets together: Not on file     Attends [employer] service: Not on file     Active member of club or organization: Not on file     Attends meetings of clubs or organizations: Not on file     Relationship status: Not on file    Intimate partner violence:     Fear of current or ex partner: Not on file     Emotionally abused: Not on file     Physically abused: Not on file     Forced sexual activity: Not on file   Other Topics Concern    Not on file   Social History Narrative    Not on file        Current Outpatient Medications   Medication Sig Dispense Refill    omeprazole (PRILOSEC) 20 MG delayed release capsule Take 20 mg by mouth daily  0    potassium chloride (MICRO-K) 10 MEQ extended release capsule Take 10 mEq by mouth daily      Multiple Vitamins-Minerals (THERAPEUTIC MULTIVITAMIN-MINERALS) tablet Take 1 tablet by mouth daily      Calcium Carbonate-Vit D-Min (CALCIUM-VITAMIN D-MINERALS) 600-800 MG-UNIT CHEW Take 1 tablet by mouth daily      carvedilol (COREG) 12.5 MG tablet Take 1 tablet by mouth 2 times daily 60 tablet 1    furosemide (LASIX) 40 MG tablet Take 1 tablet by mouth 2 times daily 90 tablet 1    amLODIPine (NORVASC) 10 MG tablet TAKE ONE TABLET BY MOUTH EVERY DAY 90 tablet 1    losartan (COZAAR) 25 MG tablet Take 0.5 tablets by mouth daily 30 tablet 1    spironolactone (ALDACTONE) 25 MG tablet Take 1 tablet by mouth daily 30 tablet 3    clopidogrel (PLAVIX) 75 MG tablet TAKE 1 TABLET BY MOUTH EVERY DAY 90 tablet 0    azelastine (ASTELIN) 0.1 % nasal spray 2 sprays by Nasal route 2 times daily Use in each nostril as directed 1 Bottle 1    fluticasone (FLONASE) 50 MCG/ACT nasal spray 1 spray by Nasal route daily (Patient taking differently: 1 spray by Nasal route daily as needed ) 1 Bottle 1    budesonide-formoterol (SYMBICORT) 160-4.5 MCG/ACT AERO Inhale 2 puffs into the lungs 2 times daily      glipiZIDE (GLUCOTROL XL) 2.5 MG extended release tablet Take 2.5 mg by mouth daily      aspirin 81 MG tablet Take 81 mg by mouth daily      pravastatin (PRAVACHOL) 40 MG tablet TAKE ONE TABLET BY MOUTH EVERY DAY AT BEDTIME 90 tablet 3    gemfibrozil (LOPID) 600 MG tablet TAKE ONE TABLET BY MOUTH TWICE DAILY 180 tablet 2     No current facility-administered medications for this visit.         Allergies as of 05/14/2019 - Review Complete 05/14/2019   Allergen Reaction Noted    Hydralazine hcl Shortness Of Breath and Other (See Comments) 03/16/2019    Neosporin [neomycin-bacitracin zn-polymyx]  05/12/2011    Sulfa antibiotics Nausea Only 04/26/2011    Codeine Nausea And Vomiting and Nausea Only 12/29/2011       Patient Active Problem List   Diagnosis    Hypertension    Hyperlipidemia    Coronary artery disease due to lipid rich plaque    Constipation    Prediabetes    Insomnia    Vertigo, central origin    Cholelithiasis    Abdominal mass    Depression    ABDI (obstructive sleep apnea)    Bradycardia    Seasonal allergies    Murmur    Malaise    Aortic stenosis, moderate    Otorrhea of left ear    Bronchitis, acute    Chronic cough    SOB (shortness of breath)    Acute bronchitis    Prostate CA (HCC)    Eustachian tube disorder    LBBB (left bundle branch block)    LVH (left ventricular hypertrophy)    Non-rheumatic mitral regurgitation    Nonrheumatic aortic valve stenosis    Pacemaker    Dyspnea on exertion    Acute combined systolic and diastolic congestive heart failure (HCC)    Dilated cardiomyopathy (HCC)    S/P TAVR (transcatheter aortic valve replacement)    Coronary artery disease involving native coronary artery of native heart without angina pectoris    Obesity    Volume overload       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 failure (HCC) 1/2/2018    Allergic     Anemia     Anxiety     Aortic stenosis     Arthritis     BPH (benign prostatic hypertrophy)     Bradycardia     CAD (coronary artery disease)     Depression     Diabetes mellitus ([facility] 8/21/2012    Erectile dysfunction     Hyperlipidemia     Hypertension     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    Obesity     ABDI (obstructive sleep apnea) 5/1/2013    Prostate CA ([facility] 4/21/2015    Prostate cancer (HCC)     SOB (shortness of breath) on exertion     Unspecified sleep apnea     uses cpap       Past Surgical History:   Procedure Laterality Date    APPENDECTOMY      CHOLECYSTECTOMY, LAPAROSCOPIC  12/30/2011    WITH CHOLANGIOGRAM    COLONOSCOPY      COSMETIC SURGERY      FRACTURE SURGERY      HERNIA REPAIR      JOINT REPLACEMENT      left knee    MYRINGOTOMY Left 8/12/13    LEFT INSERTION OF PE TUBE LEFT, NASOPHARYNGOSCOPY    PTCA      SHOULDER ARTHROPLASTY      LT    TONSILLECTOMY      TYMPANOSTOMY TUBE PLACEMENT  [id number]    MYRINGOTOMY   INSERTION OF PE TUBE -LEFT,EVALUATION OF       Family History   Problem Relation Age of Onset    Heart Disease Father     Early Death Father     Coronary Art Dis Other     Sleep Apnea Son        Review of Systems   Constitutional: Positive for fatigue. Negative for activity change and appetite change. HENT: Positive for congestion.  Negative for nosebleeds, postnasal drip, rhinorrhea and sneezing. Eyes: Negative for photophobia, pain and visual disturbance. Respiratory: Negative for apnea, choking, chest tightness and shortness of breath. Cardiovascular: Negative. Gastrointestinal: Negative for abdominal distention, abdominal pain, nausea and vomiting. Endocrine: Negative for cold intolerance and heat intolerance. Genitourinary: Positive for frequency. Negative for difficulty urinating, dysuria and urgency. Musculoskeletal: Negative. Negative for neck pain and neck stiffness. Skin: Negative. Allergic/Immunologic: Negative. Neurological: Negative for tremors, seizures, syncope and weakness. Hematological: Negative for adenopathy. Does not bruise/bleed easily. Psychiatric/Behavioral: Positive for decreased concentration and sleep disturbance. Negative for agitation, behavioral problems and confusion. Objective:     Vitals:  Weight BMI   Wt Readings from Last 3 Encounters:   05/14/19 261 lb (118.4 kg)   05/13/19 262 lb (118.8 kg)   04/23/19 258 lb (117 kg)    Body mass index is 37.45 kg/m². BP HR SaO2   BP Readings from Last 3 Encounters:   05/14/19 132/78   05/13/19 136/62   04/23/19 (!) 146/60    Pulse Readings from Last 3 Encounters:   05/14/19 60   05/13/19 60   04/23/19 65    SpO2 Readings from Last 3 Encounters:   05/14/19 94%   05/13/19 95%   04/23/19 95%        Physical Exam   Constitutional: He is oriented to person, place, and time. Vital signs are normal. He appears well-developed and well-nourished. Non-toxic appearance. He does not have a sickly appearance. He is not intubated. HENT:   Head: Normocephalic and atraumatic. Not macrocephalic and not microcephalic. Right Ear: External ear normal.   Left Ear: External ear normal.   Nose: Mucosal edema and septal deviation present. Mouth/Throat: Uvula is midline and mucous membranes are normal. Posterior oropharyngeal edema present. No oropharyngeal exudate or tonsillar abscesses. Tonsils:   absent bilaterally, normal appearance  Post. Pharynx:   normal mucosa  Neck circumference: 21  Inches     Eyes: Pupils are equal, round, and reactive to light. Conjunctivae, EOM and lids are normal.   Neck: Trachea normal and normal range of motion. Neck supple. No tracheal deviation present. No thyroid mass and no thyromegaly present. Cardiovascular: Normal rate, regular rhythm, S1 normal and S2 normal.   Murmur heard. Systolic murmur is present with a grade of 2/6. Pulmonary/Chest: Effort normal. No accessory muscle usage. No apnea, no tachypnea and no bradypnea. He is not intubated. No respiratory distress. He has no decreased breath sounds. He has no wheezes. He has no rhonchi. He has no rales. He exhibits no mass, no tenderness and no deformity. Abdominal: Soft. Bowel sounds are normal. He exhibits no distension. There is no tenderness. Musculoskeletal: He exhibits no edema. Gait - normal  No evidence of cyanosis or clubbing of nails   Lymphadenopathy:        Head (right side): No submental, no submandibular and no tonsillar adenopathy present. Head (left side): No submental, no submandibular and no tonsillar adenopathy present. Neurological: He is alert and oriented to person, place, and time. No cranial nerve deficit. Skin: Skin is warm, dry and intact. No cyanosis. Nails show no clubbing. Psychiatric: He has a normal mood and affect. His speech is normal and behavior is normal. Judgment and thought content normal.   Nursing note and vitals reviewed.       Electronically signed by Scotty Nino MD on5/14/2019 at 3:43 PM

## 2019-05-16 ENCOUNTER — TELEPHONE (OUTPATIENT)
Dept: CARDIOLOGY CLINIC | Age: 84
End: 2019-05-16

## 2019-05-24 ENCOUNTER — HOSPITAL ENCOUNTER (OUTPATIENT)
Dept: MRI IMAGING | Age: 84
Discharge: HOME | End: 2019-05-24
Payer: OTHER MISCELLANEOUS

## 2019-05-24 ENCOUNTER — TELEPHONE (OUTPATIENT)
Dept: PULMONOLOGY | Age: 84
End: 2019-05-24

## 2019-05-24 VITALS
RESPIRATION RATE: 16 BRPM | HEART RATE: 60 BPM | OXYGEN SATURATION: 98 % | SYSTOLIC BLOOD PRESSURE: 150 MMHG | DIASTOLIC BLOOD PRESSURE: 79 MMHG

## 2019-05-24 DIAGNOSIS — E66.9 NON MORBID OBESITY, UNSPECIFIED OBESITY TYPE: ICD-10-CM

## 2019-05-24 DIAGNOSIS — I50.42 CHRONIC COMBINED SYSTOLIC AND DIASTOLIC CONGESTIVE HEART FAILURE (HCC): ICD-10-CM

## 2019-05-24 DIAGNOSIS — G47.33 OSA (OBSTRUCTIVE SLEEP APNEA): ICD-10-CM

## 2019-05-24 DIAGNOSIS — G47.00 INSOMNIA, UNSPECIFIED TYPE: Primary | ICD-10-CM

## 2019-05-24 DIAGNOSIS — M51.25 OTHER INTERVERTEBRAL DISC DISPLACEMENT, THORACOLUMBAR REGION: ICD-10-CM

## 2019-05-24 DIAGNOSIS — I10 ESSENTIAL HYPERTENSION: ICD-10-CM

## 2019-05-24 DIAGNOSIS — I25.10 CORONARY ARTERY DISEASE DUE TO LIPID RICH PLAQUE: ICD-10-CM

## 2019-05-24 DIAGNOSIS — I25.83 CORONARY ARTERY DISEASE DUE TO LIPID RICH PLAQUE: ICD-10-CM

## 2019-05-24 DIAGNOSIS — S13.4XXD SPRAIN OF LIGAMENTS OF CERVICAL SPINE, SUBSEQUENT ENCOUNTER: ICD-10-CM

## 2019-05-24 PROCEDURE — 72148 MRI LUMBAR SPINE W/O DYE: CPT

## 2019-05-24 PROCEDURE — 72141 MRI NECK SPINE W/O DYE: CPT

## 2019-06-05 ENCOUNTER — HOSPITAL ENCOUNTER (OUTPATIENT)
Age: 84
Discharge: HOME | End: 2019-06-05
Payer: OTHER MISCELLANEOUS

## 2019-06-05 ENCOUNTER — HOSPITAL ENCOUNTER (OUTPATIENT)
Dept: GENERAL RADIOLOGY | Age: 84
Discharge: HOME | End: 2019-06-05
Payer: OTHER MISCELLANEOUS

## 2019-06-05 DIAGNOSIS — M25.552 LEFT HIP PAIN: ICD-10-CM

## 2019-06-05 DIAGNOSIS — M25.562 ACUTE PAIN OF LEFT KNEE: ICD-10-CM

## 2019-06-05 PROCEDURE — 73502 X-RAY EXAM HIP UNI 2-3 VIEWS: CPT

## 2019-06-05 PROCEDURE — 73560 X-RAY EXAM OF KNEE 1 OR 2: CPT

## 2019-06-07 ENCOUNTER — TELEPHONE (OUTPATIENT)
Dept: CARDIOLOGY CLINIC | Age: 84
End: 2019-06-07

## 2019-06-07 NOTE — TELEPHONE ENCOUNTER
CARDIAC CLEARANCE REQUEST    What type of procedure are you having: Knee replacement surgery    Are you taking any blood thinners:aspirin    When is your procedure scheduled for:not scheduled yet    What physician is performing your procedure: Rebecca,     Phone Number:[phone]    Fax number to send the letter:    Please call patient and advise.   conor

## 2019-06-07 NOTE — TELEPHONE ENCOUNTER
Last OV 01/14/19 with [facility]; Last visit was 05/13/19 with [facility]      ASSESSMENT AND PLAN      ~AS    Date EF Detail   Sx     No concerning   Hx 6/18   TAVR 29 mm ES3   NYHA     []I         [x]II           []III          []IV   TTE 4/12 6/16 12/17 6/18 7/18 65%  55%  40%  40%  40% Mild AS, MG 14  Mod AS, MG 24  Mod to Sev AS, MG 37  TAVR well seated, MG 9  TAVR well seated MG 12mmHg   STTE 5/18 40% Sev AS MG 46   Plan     Doing well.   No concerning sx   ~CAD    Date EF Results   Sx     SOB   [facility] 2006 5/18      PTCA of ?  50% mid LAD, 50% mid Cx, 50% mid RCA Cranks Sas)   MPI 12/17 35% Fixed inferior apical defect, no ischemia   Plan     Continue aggressive medical treatment at doses above   ~Bradycardia   s/p Pacemaker 6/16   VT noted on pacemaker was intraoperative TAVR and correlates with rapid pacing  No indication for VT workup/treatment  ~Obesity  BMI []  25-29.9 [x]  >30        [x]  Diet [x]  Exercise [x]  Surgery   Plan     Discussed lifestyle changes and spectrum of weight loss options in detail  ~Followup  Interval:           6 months  Tests/Labs:     Echo next OV     Scribe Attestation:  Mohan Tadeo am scribing for and in the presence of Terrie Gregorio MD.   Signed, Harley Dove 01/11/19 8:33 AM

## 2019-06-07 NOTE — TELEPHONE ENCOUNTER
Per Dr. Mamta Marx - patient is at moderate risk. Okay to hold Plavix 5 days before procedure. Do not hold ASA unless it is absolutely necessary for procedure. Preoperative risk stratification letter signed by Dr. Mamta Marx and faxed successfully to [phone].

## 2019-06-10 ENCOUNTER — TELEPHONE (OUTPATIENT)
Dept: CARDIOLOGY CLINIC | Age: 84
End: 2019-06-10

## 2019-06-10 NOTE — TELEPHONE ENCOUNTER
Pt is calling because he is wanting to have surgery on his knee but he doesn't want to make the appt for the surgeon until he knows if [facility] is going to clear him for it. He would like [facility] to call to let him know. Pls call to advise. Thank you.

## 2019-06-10 NOTE — TELEPHONE ENCOUNTER
Last OV with [facility] 01/14/19; last seen [facility] 05/13/19      ASSESSMENT AND PLAN      ~AS    Date EF Detail   Sx     No concerning   Hx 6/18   TAVR 29 mm ES3   NYHA     []I         [x]II           []III          []IV   TTE 4/12 6/16 12/17 6/18 7/18 65%  55%  40%  40%  40% Mild AS, MG 14  Mod AS, MG 24  Mod to Sev AS, MG 37  TAVR well seated, MG 9  TAVR well seated MG 12mmHg   STTE 5/18 40% Sev AS MG 46   Plan     Doing well.   No concerning sx   ~CAD    Date EF Results   Sx     SOB   [facility] 2006 5/18      PTCA of ?  50% mid LAD, 50% mid Cx, 50% mid RCA [place])   MPI 12/17 35% Fixed inferior apical defect, no ischemia   Plan     Continue aggressive medical treatment at doses above   ~Bradycardia   s/p Pacemaker 6/16   VT noted on pacemaker was intraoperative TAVR and correlates with rapid pacing  No indication for VT workup/treatment  ~Obesity  BMI []  25-29.9 [x]  >30        [x]  Diet [x]  Exercise [x]  Surgery   Plan     Discussed lifestyle changes and spectrum of weight loss options in detail  ~Followup  Interval:           6 months  Tests/Labs:     Echo next OV     Scribe Attestation:  Claudio Smith am scribing for and in the presence of Rodrick Mayo MD.   Signed, Kuldip Wade 01/11/19 8:33 AM

## 2019-06-10 NOTE — TELEPHONE ENCOUNTER
I spoke with pt and relayed message per [facility]. Pt verbalized understanding.

## 2019-06-16 ENCOUNTER — HOSPITAL ENCOUNTER (OUTPATIENT)
Dept: SLEEP CENTER | Age: 84
Discharge: HOME | End: 2019-06-16
Payer: MEDICARE

## 2019-06-16 DIAGNOSIS — G47.33 OSA (OBSTRUCTIVE SLEEP APNEA): ICD-10-CM

## 2019-06-16 DIAGNOSIS — I10 ESSENTIAL HYPERTENSION: ICD-10-CM

## 2019-06-16 DIAGNOSIS — G47.00 INSOMNIA, UNSPECIFIED TYPE: ICD-10-CM

## 2019-06-16 DIAGNOSIS — I25.83 CORONARY ARTERY DISEASE DUE TO LIPID RICH PLAQUE: ICD-10-CM

## 2019-06-16 DIAGNOSIS — E66.9 NON MORBID OBESITY, UNSPECIFIED OBESITY TYPE: ICD-10-CM

## 2019-06-16 DIAGNOSIS — I50.42 CHRONIC COMBINED SYSTOLIC AND DIASTOLIC CONGESTIVE HEART FAILURE (HCC): ICD-10-CM

## 2019-06-16 DIAGNOSIS — I25.10 CORONARY ARTERY DISEASE DUE TO LIPID RICH PLAQUE: ICD-10-CM

## 2019-06-16 PROCEDURE — 95811 POLYSOM 6/>YRS CPAP 4/> PARM: CPT

## 2019-06-18 PROCEDURE — 95811 POLYSOM 6/>YRS CPAP 4/> PARM: CPT | Performed by: INTERNAL MEDICINE

## 2019-06-19 ENCOUNTER — TELEPHONE (OUTPATIENT)
Dept: PULMONOLOGY | Age: 84
End: 2019-06-19

## 2019-06-19 NOTE — TELEPHONE ENCOUNTER
Spoke with pt to order unit. Order to be sent to [facility] due to location. Pt has a f/u scheduled and was asked to bring unit.

## 2019-06-24 RX ORDER — LOSARTAN POTASSIUM 25 MG/1
12.5 TABLET ORAL DAILY
Qty: 45 TABLET | Refills: 2 | Status: SHIPPED | OUTPATIENT
Start: 2019-06-24 | End: 2020-09-17 | Stop reason: DRUGHIGH

## 2019-07-17 NOTE — PROGRESS NOTES
[address]     H+P // CONSULT // OUTPATIENT VISIT // [facility] VISIT     Referring Doctor Bert Hernandez MD   Encounter Type Followup     CHIEF COMPLAINT     Visit Type Chronic   Symptoms None   Problems AS s/p TAVR, CAD, HTN, CHOL, NELY     HISTORY OF PRESENT ILLNESS      GEN - Doing well. Denies new concerns.  AS - s/p TAVR. No cp, sob, dizziness, syncope   CAD - Denies cp, sob, dizziness, syncope, palpitations.  HTN - Ambulatory BP readings in good range. No HA or dizziness.  CHOL - Last cholesterol reviewed and in good range.  NELY - s/p PPM 6/16. Denies dizziness, syncope. Interval interrogations reviewed.  OBESITY - Continued dietary indiscretions and lack of exercise. Massively overweight.  MED - Compliant with CV meds listed below without notable side effects     COMPLIANCE     Category Meds Diet Salt Exercise Tobacco Alcohol Drugs   Compliant [x] [] [] [] [x] [x] [x]   [x]Counseling given on all above above categories    HISTORY/ALLERGIES/ROS     MedHx:  has a past medical history of Acute combined systolic and diastolic congestive heart failure ([address] Allergic, Anemia, Anxiety, Aortic stenosis, Arthritis, BPH (benign prostatic hypertrophy), Bradycardia, CAD (coronary artery disease), Depression, Diabetes mellitus ([address] Erectile dysfunction, Hyperlipidemia, Hypertension, Nasal defect, Obesity, ABDI (obstructive sleep apnea), Prostate CA ([address] Prostate cancer ([address] SOB (shortness of breath) on exertion, and Unspecified sleep apnea. SurgHx:  has a past surgical history that includes hernia repair; Appendectomy; Total shoulder arthroplasty; Colonoscopy; Cholecystectomy, laparoscopic (12/30/2011); Percutaneous Transluminal Coronary Angio; joint replacement; Tympanostomy tube placement (049522); Cosmetic surgery; fracture surgery; myringotomy (Left, 8/12/13); Tonsillectomy; and Anomalous venous return repair. SocHx:  reports that he has never smoked.  He has

## 2019-07-18 ENCOUNTER — OFFICE VISIT (OUTPATIENT)
Dept: CARDIOLOGY CLINIC | Age: 84
End: 2019-07-18
Payer: MEDICARE

## 2019-07-18 ENCOUNTER — HOSPITAL ENCOUNTER (OUTPATIENT)
Dept: NON INVASIVE DIAGNOSTICS | Age: 84
Discharge: HOME | End: 2019-07-18
Payer: MEDICARE

## 2019-07-18 VITALS
HEART RATE: 60 BPM | OXYGEN SATURATION: 98 % | DIASTOLIC BLOOD PRESSURE: 70 MMHG | HEIGHT: 70 IN | BODY MASS INDEX: 37.8 KG/M2 | SYSTOLIC BLOOD PRESSURE: 138 MMHG | WEIGHT: 264 LBS

## 2019-07-18 DIAGNOSIS — E66.9 OBESITY (BMI 35.0-39.9 WITHOUT COMORBIDITY): ICD-10-CM

## 2019-07-18 DIAGNOSIS — Z95.0 PACEMAKER: ICD-10-CM

## 2019-07-18 DIAGNOSIS — R00.1 BRADYCARDIA: ICD-10-CM

## 2019-07-18 DIAGNOSIS — I35.0 NONRHEUMATIC AORTIC VALVE STENOSIS: ICD-10-CM

## 2019-07-18 DIAGNOSIS — I35.0 NONRHEUMATIC AORTIC VALVE STENOSIS: Primary | ICD-10-CM

## 2019-07-18 DIAGNOSIS — Z95.2 S/P TAVR (TRANSCATHETER AORTIC VALVE REPLACEMENT): ICD-10-CM

## 2019-07-18 DIAGNOSIS — I25.10 CORONARY ARTERY DISEASE INVOLVING NATIVE CORONARY ARTERY OF NATIVE HEART WITHOUT ANGINA PECTORIS: ICD-10-CM

## 2019-07-18 LAB
LEFT VENTRICULAR EJECTION FRACTION HIGH VALUE: 45 %
LEFT VENTRICULAR EJECTION FRACTION MODE: NORMAL
LV EF: 45 %
LVEF MODALITY: NORMAL

## 2019-07-18 PROCEDURE — 99214 OFFICE O/P EST MOD 30 MIN: CPT | Performed by: INTERNAL MEDICINE

## 2019-07-18 PROCEDURE — G8417 CALC BMI ABV UP PARAM F/U: HCPCS | Performed by: INTERNAL MEDICINE

## 2019-07-18 PROCEDURE — 1036F TOBACCO NON-USER: CPT | Performed by: INTERNAL MEDICINE

## 2019-07-18 PROCEDURE — 93306 TTE W/DOPPLER COMPLETE: CPT

## 2019-07-18 PROCEDURE — 4040F PNEUMOC VAC/ADMIN/RCVD: CPT | Performed by: INTERNAL MEDICINE

## 2019-07-18 PROCEDURE — 1123F ACP DISCUSS/DSCN MKR DOCD: CPT | Performed by: INTERNAL MEDICINE

## 2019-07-18 PROCEDURE — G8598 ASA/ANTIPLAT THER USED: HCPCS | Performed by: INTERNAL MEDICINE

## 2019-07-18 PROCEDURE — G8427 DOCREV CUR MEDS BY ELIG CLIN: HCPCS | Performed by: INTERNAL MEDICINE

## 2019-07-18 NOTE — LETTER
SurgHx:  has a past surgical history that includes hernia repair; Appendectomy; Total shoulder arthroplasty; Colonoscopy; Cholecystectomy, laparoscopic (12/30/2011); Percutaneous Transluminal Coronary Angio; joint replacement; Tympanostomy tube placement ([id number]); Cosmetic surgery; fracture surgery; myringotomy (Left, 8/12/13); Tonsillectomy; and Anomalous venous return repair. SocHx:  reports that he has never smoked. He has never used smokeless tobacco. He reports that he does not drink alcohol or use drugs. FamHx: family history includes Coronary Art Dis in an other family member; Early Death in his father; Heart Disease in his father; Sleep Apnea in his son. Allergies: Hydralazine hcl; Neosporin [neomycin-bacitracin zn-polymyx];  Sulfa antibiotics; and Codeine   ROS:  [x]Full ROS obtained and negative except as mentioned in HPI     MEDICATIONS      Current Outpatient Medications   Medication Sig Dispense Refill    carvedilol (COREG) 12.5 MG tablet TAKE 1 TABLET BY MOUTH TWICE DAILY 180 tablet 0    losartan (COZAAR) 25 MG tablet Take 0.5 tablets by mouth daily 45 tablet 2    carvedilol (COREG) 12.5 MG tablet TAKE 1 TABLET BY MOUTH TWICE DAILY 180 tablet 1    traZODone (DESYREL) 50 MG tablet 1/2-3 po qHS, start 1/2 pill qHS, can increase by 1/2 pill every 7 nights if still not sleeping to a max of 3 pills po qHS 90 tablet 2    omeprazole (PRILOSEC) 20 MG delayed release capsule Take 20 mg by mouth daily  0    potassium chloride (MICRO-K) 10 MEQ extended release capsule Take 10 mEq by mouth daily      Multiple Vitamins-Minerals (THERAPEUTIC MULTIVITAMIN-MINERALS) tablet Take 1 tablet by mouth daily      Calcium Carbonate-Vit D-Min (CALCIUM-VITAMIN D-MINERALS) 600-800 MG-UNIT CHEW Take 1 tablet by mouth daily      furosemide (LASIX) 40 MG tablet Take 1 tablet by mouth 2 times daily 90 tablet 1    amLODIPine (NORVASC) 10 MG tablet TAKE ONE TABLET BY MOUTH EVERY DAY 90 tablet 1

## 2019-07-30 ENCOUNTER — NURSE ONLY (OUTPATIENT)
Dept: CARDIOLOGY CLINIC | Age: 84
End: 2019-07-30
Payer: MEDICARE

## 2019-07-30 DIAGNOSIS — R00.1 BRADYCARDIA: Primary | ICD-10-CM

## 2019-07-30 DIAGNOSIS — Z95.0 PACEMAKER: ICD-10-CM

## 2019-07-30 PROCEDURE — 93296 REM INTERROG EVL PM/IDS: CPT | Performed by: INTERNAL MEDICINE

## 2019-07-30 PROCEDURE — 93294 REM INTERROG EVL PM/LDLS PM: CPT | Performed by: INTERNAL MEDICINE

## 2019-07-31 ENCOUNTER — TELEPHONE (OUTPATIENT)
Dept: CARDIOLOGY CLINIC | Age: 84
End: 2019-07-31

## 2019-08-12 ENCOUNTER — TELEPHONE (OUTPATIENT)
Dept: PULMONOLOGY | Age: 84
End: 2019-08-12

## 2019-08-12 NOTE — TELEPHONE ENCOUNTER
Patient stopped in to see why he hasn't heard anything from us regarding his sleep study regarding results? Patient complained that the room was too cold and patient got a chest cold and still does since sleep study. I spoke with Robert Hodgson and she said patient not eligible for a new machine until 11/23/2020. Patient received a machine through [facility] November, 2015 and Medicare received all payments.     [phone]

## 2019-08-13 ENCOUNTER — OFFICE VISIT (OUTPATIENT)
Dept: PULMONOLOGY | Age: 84
End: 2019-08-13
Payer: MEDICARE

## 2019-08-13 VITALS
BODY MASS INDEX: 37.22 KG/M2 | WEIGHT: 260 LBS | OXYGEN SATURATION: 95 % | DIASTOLIC BLOOD PRESSURE: 71 MMHG | HEIGHT: 70 IN | SYSTOLIC BLOOD PRESSURE: 131 MMHG | HEART RATE: 60 BPM

## 2019-08-13 DIAGNOSIS — E66.01 CLASS 2 SEVERE OBESITY DUE TO EXCESS CALORIES WITH SERIOUS COMORBIDITY AND BODY MASS INDEX (BMI) OF 37.0 TO 37.9 IN ADULT (HCC): ICD-10-CM

## 2019-08-13 DIAGNOSIS — G47.00 INSOMNIA, UNSPECIFIED TYPE: ICD-10-CM

## 2019-08-13 DIAGNOSIS — I42.0 DILATED CARDIOMYOPATHY (HCC): Chronic | ICD-10-CM

## 2019-08-13 DIAGNOSIS — I10 ESSENTIAL HYPERTENSION: Chronic | ICD-10-CM

## 2019-08-13 DIAGNOSIS — G47.61 PERIODIC LIMB MOVEMENT DISORDER (PLMD): ICD-10-CM

## 2019-08-13 DIAGNOSIS — I25.83 CORONARY ARTERY DISEASE DUE TO LIPID RICH PLAQUE: Chronic | ICD-10-CM

## 2019-08-13 DIAGNOSIS — I25.10 CORONARY ARTERY DISEASE DUE TO LIPID RICH PLAQUE: Chronic | ICD-10-CM

## 2019-08-13 DIAGNOSIS — G47.33 OSA (OBSTRUCTIVE SLEEP APNEA): Primary | Chronic | ICD-10-CM

## 2019-08-13 PROBLEM — E66.9 OBESITY: Chronic | Status: ACTIVE | Noted: 2019-01-11

## 2019-08-13 PROCEDURE — G8427 DOCREV CUR MEDS BY ELIG CLIN: HCPCS | Performed by: INTERNAL MEDICINE

## 2019-08-13 PROCEDURE — 99214 OFFICE O/P EST MOD 30 MIN: CPT | Performed by: INTERNAL MEDICINE

## 2019-08-13 PROCEDURE — 1036F TOBACCO NON-USER: CPT | Performed by: INTERNAL MEDICINE

## 2019-08-13 PROCEDURE — G8417 CALC BMI ABV UP PARAM F/U: HCPCS | Performed by: INTERNAL MEDICINE

## 2019-08-13 PROCEDURE — 4040F PNEUMOC VAC/ADMIN/RCVD: CPT | Performed by: INTERNAL MEDICINE

## 2019-08-13 PROCEDURE — 1123F ACP DISCUSS/DSCN MKR DOCD: CPT | Performed by: INTERNAL MEDICINE

## 2019-08-13 PROCEDURE — G8598 ASA/ANTIPLAT THER USED: HCPCS | Performed by: INTERNAL MEDICINE

## 2019-08-13 RX ORDER — MIRTAZAPINE 15 MG/1
TABLET, FILM COATED ORAL
Qty: 81 TABLET | Refills: 2 | OUTPATIENT
Start: 2019-08-13

## 2019-08-13 RX ORDER — AZELASTINE 1 MG/ML
1 SPRAY, METERED NASAL 2 TIMES DAILY
COMMUNITY
End: 2019-10-14

## 2019-08-13 RX ORDER — MIRTAZAPINE 15 MG/1
TABLET, FILM COATED ORAL
Qty: 30 TABLET | Refills: 2 | Status: SHIPPED | OUTPATIENT
Start: 2019-08-13 | End: 2020-01-16

## 2019-08-13 ASSESSMENT — SLEEP AND FATIGUE QUESTIONNAIRES
HOW LIKELY ARE YOU TO NOD OFF OR FALL ASLEEP WHILE SITTING AND TALKING TO SOMEONE: 0
HOW LIKELY ARE YOU TO NOD OFF OR FALL ASLEEP IN A CAR, WHILE STOPPED FOR A FEW MINUTES IN TRAFFIC: 0
HOW LIKELY ARE YOU TO NOD OFF OR FALL ASLEEP WHILE LYING DOWN TO REST IN THE AFTERNOON WHEN CIRCUMSTANCES PERMIT: 2
HOW LIKELY ARE YOU TO NOD OFF OR FALL ASLEEP WHEN YOU ARE A PASSENGER IN A CAR FOR AN HOUR WITHOUT A BREAK: 0
HOW LIKELY ARE YOU TO NOD OFF OR FALL ASLEEP WHILE SITTING AND READING: 1
HOW LIKELY ARE YOU TO NOD OFF OR FALL ASLEEP WHILE SITTING INACTIVE IN A PUBLIC PLACE: 0
ESS TOTAL SCORE: 5
HOW LIKELY ARE YOU TO NOD OFF OR FALL ASLEEP WHILE SITTING QUIETLY AFTER LUNCH WITHOUT ALCOHOL: 0
HOW LIKELY ARE YOU TO NOD OFF OR FALL ASLEEP WHILE WATCHING TV: 2

## 2019-08-13 ASSESSMENT — ENCOUNTER SYMPTOMS
SINUS PRESSURE: 0
EYE PAIN: 0
STRIDOR: 0
PHOTOPHOBIA: 0
CHEST TIGHTNESS: 0
SHORTNESS OF BREATH: 0

## 2019-08-13 NOTE — PROGRESS NOTES
pertinent to this visit. The chronic medical conditions listed are directly related to the primary diagnosis listed above. The management of the primary diagnosis affects the secondary diagnosis and vice versa. Orders Placed This Encounter   Medications    mirtazapine (REMERON) 15 MG tablet     Si/2-1 po qHS, start 1/2 po qHS, can increase to 1 full tab po qHS if a week if needed     Dispense:  30 tablet     Refill:  2     Subjective:     Patient ID: Atiya Almanza is a 80 y.o. male. Chief Complaint   Patient presents with    Sleep Apnea     Subjective   HPI:    Machine Modem/Download Info:  Compliance (hours/night): 14.9 hrs/night  Download AHI (/hour): 4.8 /HR  Average IPAP Pressure: 16.3 cmH2O  Average EPAP Pressure: 12.3 cmH2O AUTO BILEVEL - Settings (ResMed)  IPAP Max: 25 cmH2O  EPAP Min: 8 cmH2O  Pressure Support: 4     Comfort Settings  Humidity Level (0-8): 2  Heated Tubing (Yes/No): Yes PAP Mask  Mask Type: Nasal mask  Mask Model: Mirage Ultra     ABDI: Had repeat titration done but machine pressure was not changed. Insomnia:  Feels trazodone does not help, taking 100 mg and feels it helps a little but wakes at night and wakes in the AM groggy. Feels a lot of anxiety, feels abandoned by his family. Feels he has no support. Feels his is SOB and panics about not being able to breath. PLMD's:  Bilevel titration showed significant leg movements. Coronary artery disease, hypertension, cardiomyopathy, and obesity: stable on meds and followed by pt's PCP and other physicians.     DME Company - [employer]    Constable - Total score: 5    Social History     Socioeconomic History    Marital status:      Spouse name: Not on file    Number of children: Not on file    Years of education: Not on file    Highest education level: Not on file   Occupational History     Employer: RETIRED   Social Needs    Financial resource strain: Not on file    Food insecurity:     Worry: Not on

## 2019-08-13 NOTE — ASSESSMENT & PLAN NOTE
Chronic- Stable. Reviewed compliance download with pt. Supplies and parts as needed for his machine. These are medically necessary. Continue medications per his PCP and other physicians. Limit caffeine use after 3pm.  Will change PS-6 per study results.

## 2019-08-22 ENCOUNTER — OFFICE VISIT (OUTPATIENT)
Dept: ENT CLINIC | Age: 84
End: 2019-08-22
Payer: MEDICARE

## 2019-08-22 VITALS — SYSTOLIC BLOOD PRESSURE: 148 MMHG | HEART RATE: 59 BPM | DIASTOLIC BLOOD PRESSURE: 66 MMHG | OXYGEN SATURATION: 97 %

## 2019-08-22 DIAGNOSIS — J30.9 ALLERGIC SINUSITIS: Primary | ICD-10-CM

## 2019-08-22 DIAGNOSIS — J32.9 RECURRENT SINUSITIS: ICD-10-CM

## 2019-08-22 PROCEDURE — G8427 DOCREV CUR MEDS BY ELIG CLIN: HCPCS | Performed by: OTOLARYNGOLOGY

## 2019-08-22 PROCEDURE — 99213 OFFICE O/P EST LOW 20 MIN: CPT | Performed by: OTOLARYNGOLOGY

## 2019-08-22 PROCEDURE — 1123F ACP DISCUSS/DSCN MKR DOCD: CPT | Performed by: OTOLARYNGOLOGY

## 2019-08-22 PROCEDURE — 1036F TOBACCO NON-USER: CPT | Performed by: OTOLARYNGOLOGY

## 2019-08-22 PROCEDURE — G8598 ASA/ANTIPLAT THER USED: HCPCS | Performed by: OTOLARYNGOLOGY

## 2019-08-22 PROCEDURE — 96372 THER/PROPH/DIAG INJ SC/IM: CPT | Performed by: OTOLARYNGOLOGY

## 2019-08-22 PROCEDURE — 4040F PNEUMOC VAC/ADMIN/RCVD: CPT | Performed by: OTOLARYNGOLOGY

## 2019-08-22 PROCEDURE — G8417 CALC BMI ABV UP PARAM F/U: HCPCS | Performed by: OTOLARYNGOLOGY

## 2019-08-22 RX ORDER — AZITHROMYCIN 250 MG/1
250 TABLET, FILM COATED ORAL DAILY
Qty: 1 PACKET | Refills: 0 | Status: SHIPPED | OUTPATIENT
Start: 2019-08-22 | End: 2019-10-14

## 2019-08-22 RX ORDER — METHYLPREDNISOLONE ACETATE 40 MG/ML
40 INJECTION, SUSPENSION INTRA-ARTICULAR; INTRALESIONAL; INTRAMUSCULAR; SOFT TISSUE ONCE
Status: COMPLETED | OUTPATIENT
Start: 2019-08-22 | End: 2019-08-22

## 2019-08-22 RX ORDER — MONTELUKAST SODIUM 10 MG/1
10 TABLET ORAL NIGHTLY
Qty: 30 TABLET | Refills: 1 | Status: SHIPPED | OUTPATIENT
Start: 2019-08-22 | End: 2019-10-14

## 2019-08-22 RX ADMIN — METHYLPREDNISOLONE ACETATE 40 MG: 40 INJECTION, SUSPENSION INTRA-ARTICULAR; INTRALESIONAL; INTRAMUSCULAR; SOFT TISSUE at 09:22

## 2019-08-22 NOTE — PROGRESS NOTES
Over the past several weeks, the patient has noted a marked increase in sinus congestion and posterior nasal drainage associated with problems with his hearing increasing. He has had hearing problems and has a tube in his left ear but no drainage has been noted from that side. He is now 80years old and is noticing some balance homes. Currently, he is in no acute distress. Ear examination reveals a patent PE tube present on the left side with no evidence of drainage or fluid nor is or evidence of inflammation. On the right side, the tympanic membrane appears normal.  Oral examination is unremarkable. No nystagmus is noted. The neck is free of any adenopathy, mass, thyroid enlargement. Nasal mucosa treated with cotton pledgets  impregnated with Afrin and lidocaine placed in middle meatuses against turbinates. Pledgets left in place for five minutes and removed enabling enhanced visualization. The exam revealed positive edema of mucosa. it was positive for erythema indicative of infection. There was not evidence of deviation of the septum which was not significant. There were not polyps present. .There were not other masses present. The turbinates were not enlarged beyond normal.  Findings seem consistent with sinus infection and associated allergy which probably is playing a role in some of his balance problems which more likely is mostly related to age. We will start him on a Z-Adam and he will continue use of his Flonase but on a daily basis for at least the next 2 weeks. We will start him on Singulair and give him a Depo-Medrol injection. I have asked that he contact me in 2 weeks if no significant improvement might occur.

## 2019-09-03 ENCOUNTER — OFFICE VISIT (OUTPATIENT)
Dept: ENT CLINIC | Age: 84
End: 2019-09-03
Payer: MEDICARE

## 2019-09-03 VITALS — SYSTOLIC BLOOD PRESSURE: 138 MMHG | HEART RATE: 73 BPM | DIASTOLIC BLOOD PRESSURE: 70 MMHG | OXYGEN SATURATION: 96 %

## 2019-09-03 DIAGNOSIS — H92.02 OTALGIA OF LEFT EAR: ICD-10-CM

## 2019-09-03 DIAGNOSIS — F41.9 ANXIETY: Primary | ICD-10-CM

## 2019-09-03 PROCEDURE — 4040F PNEUMOC VAC/ADMIN/RCVD: CPT | Performed by: OTOLARYNGOLOGY

## 2019-09-03 PROCEDURE — 99213 OFFICE O/P EST LOW 20 MIN: CPT | Performed by: OTOLARYNGOLOGY

## 2019-09-03 PROCEDURE — G8598 ASA/ANTIPLAT THER USED: HCPCS | Performed by: OTOLARYNGOLOGY

## 2019-09-03 PROCEDURE — 1036F TOBACCO NON-USER: CPT | Performed by: OTOLARYNGOLOGY

## 2019-09-03 PROCEDURE — G8427 DOCREV CUR MEDS BY ELIG CLIN: HCPCS | Performed by: OTOLARYNGOLOGY

## 2019-09-03 PROCEDURE — 1123F ACP DISCUSS/DSCN MKR DOCD: CPT | Performed by: OTOLARYNGOLOGY

## 2019-09-03 PROCEDURE — G8417 CALC BMI ABV UP PARAM F/U: HCPCS | Performed by: OTOLARYNGOLOGY

## 2019-09-03 RX ORDER — ALPRAZOLAM 0.25 MG/1
0.25 TABLET ORAL NIGHTLY PRN
Qty: 15 TABLET | Refills: 0 | Status: SHIPPED | OUTPATIENT
Start: 2019-09-03 | End: 2019-09-18

## 2019-09-03 RX ORDER — FLUTICASONE PROPIONATE 50 MCG
2 SPRAY, SUSPENSION (ML) NASAL DAILY
Qty: 1 BOTTLE | Refills: 2 | Status: SHIPPED | OUTPATIENT
Start: 2019-09-03 | End: 2020-01-16

## 2019-09-04 ENCOUNTER — TELEPHONE (OUTPATIENT)
Dept: ENT CLINIC | Age: 84
End: 2019-09-04

## 2019-09-04 DIAGNOSIS — J30.2 CHRONIC SEASONAL ALLERGIC RHINITIS: ICD-10-CM

## 2019-09-04 RX ORDER — FLUTICASONE PROPIONATE 50 MCG
1 SPRAY, SUSPENSION (ML) NASAL DAILY
Qty: 1 BOTTLE | Refills: 1 | Status: SHIPPED | OUTPATIENT
Start: 2019-09-04 | End: 2020-01-16

## 2019-09-10 ENCOUNTER — TELEPHONE (OUTPATIENT)
Dept: ENT CLINIC | Age: 84
End: 2019-09-10

## 2019-09-10 DIAGNOSIS — H81.09 LABYRINTHINE VERTIGO, UNSPECIFIED LATERALITY: Primary | ICD-10-CM

## 2019-09-10 RX ORDER — METHYLPREDNISOLONE 4 MG/1
4 TABLET ORAL SEE ADMIN INSTRUCTIONS
Qty: 1 KIT | Refills: 0 | Status: SHIPPED | OUTPATIENT
Start: 2019-09-10 | End: 2019-10-14

## 2019-09-10 RX ORDER — MECLIZINE HYDROCHLORIDE 25 MG/1
25 TABLET ORAL 2 TIMES DAILY
Qty: 20 TABLET | Refills: 0 | Status: SHIPPED | OUTPATIENT
Start: 2019-09-10 | End: 2019-10-14

## 2019-09-10 NOTE — TELEPHONE ENCOUNTER
[phone] ([place])   Call to pt, new Rx's at pharmacy.   Pt will f/u this week if no improvement

## 2019-09-12 ENCOUNTER — TELEPHONE (OUTPATIENT)
Dept: ENT CLINIC | Age: 84
End: 2019-09-12

## 2019-09-12 NOTE — TELEPHONE ENCOUNTER
[phone] ([place])   Call to PT, PT verbalized understanding of MD orders and will f/u if needed.

## 2019-10-14 ENCOUNTER — TELEPHONE (OUTPATIENT)
Dept: CARDIOLOGY CLINIC | Age: 84
End: 2019-10-14

## 2019-10-14 ENCOUNTER — OFFICE VISIT (OUTPATIENT)
Dept: ENT CLINIC | Age: 84
End: 2019-10-14
Payer: MEDICARE

## 2019-10-14 VITALS — HEART RATE: 60 BPM | SYSTOLIC BLOOD PRESSURE: 116 MMHG | DIASTOLIC BLOOD PRESSURE: 66 MMHG | OXYGEN SATURATION: 94 %

## 2019-10-14 DIAGNOSIS — H81.09 LABYRINTHINE VERTIGO, UNSPECIFIED LATERALITY: Primary | ICD-10-CM

## 2019-10-14 PROCEDURE — 99214 OFFICE O/P EST MOD 30 MIN: CPT | Performed by: OTOLARYNGOLOGY

## 2019-10-14 PROCEDURE — 1036F TOBACCO NON-USER: CPT | Performed by: OTOLARYNGOLOGY

## 2019-10-14 PROCEDURE — G8427 DOCREV CUR MEDS BY ELIG CLIN: HCPCS | Performed by: OTOLARYNGOLOGY

## 2019-10-14 PROCEDURE — 4040F PNEUMOC VAC/ADMIN/RCVD: CPT | Performed by: OTOLARYNGOLOGY

## 2019-10-14 PROCEDURE — G8484 FLU IMMUNIZE NO ADMIN: HCPCS | Performed by: OTOLARYNGOLOGY

## 2019-10-14 PROCEDURE — G8598 ASA/ANTIPLAT THER USED: HCPCS | Performed by: OTOLARYNGOLOGY

## 2019-10-14 PROCEDURE — 1123F ACP DISCUSS/DSCN MKR DOCD: CPT | Performed by: OTOLARYNGOLOGY

## 2019-10-14 PROCEDURE — G8417 CALC BMI ABV UP PARAM F/U: HCPCS | Performed by: OTOLARYNGOLOGY

## 2019-10-14 RX ORDER — GEMFIBROZIL 600 MG/1
600 TABLET, FILM COATED ORAL
COMMUNITY

## 2019-10-14 RX ORDER — PREDNISONE 2.5 MG
2.5 TABLET ORAL DAILY
COMMUNITY
End: 2020-01-16

## 2019-10-16 ENCOUNTER — TELEPHONE (OUTPATIENT)
Dept: ENT CLINIC | Age: 84
End: 2019-10-16

## 2019-10-29 ENCOUNTER — NURSE ONLY (OUTPATIENT)
Dept: CARDIOLOGY CLINIC | Age: 84
End: 2019-10-29
Payer: MEDICARE

## 2019-10-29 DIAGNOSIS — Z95.0 PACEMAKER: ICD-10-CM

## 2019-10-29 DIAGNOSIS — R00.1 BRADYCARDIA: ICD-10-CM

## 2019-10-29 PROCEDURE — 93294 REM INTERROG EVL PM/LDLS PM: CPT | Performed by: INTERNAL MEDICINE

## 2019-10-29 PROCEDURE — 93296 REM INTERROG EVL PM/IDS: CPT | Performed by: INTERNAL MEDICINE

## 2019-11-04 ENCOUNTER — PROCEDURE VISIT (OUTPATIENT)
Dept: AUDIOLOGY | Age: 84
End: 2019-11-04
Payer: MEDICARE

## 2019-11-04 DIAGNOSIS — R42 DIZZINESS: Primary | ICD-10-CM

## 2019-11-04 PROCEDURE — 92540 BASIC VESTIBULAR EVALUATION: CPT | Performed by: AUDIOLOGIST

## 2019-11-04 PROCEDURE — 92537 CALORIC VSTBLR TEST W/REC: CPT | Performed by: AUDIOLOGIST

## 2019-11-05 ENCOUNTER — TELEPHONE (OUTPATIENT)
Dept: OTOLARYNGOLOGY | Age: 84
End: 2019-11-05

## 2019-11-05 DIAGNOSIS — H81.4 VERTIGO OF CENTRAL ORIGIN: Primary | ICD-10-CM

## 2019-12-09 ENCOUNTER — TELEPHONE (OUTPATIENT)
Dept: PULMONOLOGY | Age: 84
End: 2019-12-09

## 2019-12-10 ENCOUNTER — TELEPHONE (OUTPATIENT)
Dept: PULMONOLOGY | Age: 84
End: 2019-12-10

## 2020-01-06 ENCOUNTER — TELEPHONE (OUTPATIENT)
Dept: PULMONOLOGY | Age: 85
End: 2020-01-06

## 2020-01-09 ENCOUNTER — TELEPHONE (OUTPATIENT)
Dept: PULMONOLOGY | Age: 85
End: 2020-01-09

## 2020-01-13 ENCOUNTER — TELEPHONE (OUTPATIENT)
Dept: PULMONOLOGY | Age: 85
End: 2020-01-13

## 2020-01-13 PROBLEM — E66.9 OBESITY (BMI 35.0-39.9 WITHOUT COMORBIDITY): Status: ACTIVE | Noted: 2020-01-13

## 2020-01-13 NOTE — TELEPHONE ENCOUNTER
refaxed order for auto bipap resmed airsense to [facility] today, per Dr. Alberto Julian pt does not qualify for resmed aircurve.  Left detailed message for bautista (pt son) in regards to what was ordered and why

## 2020-01-13 NOTE — PROGRESS NOTES
[address]     H+P // CONSULT // OUTPATIENT VISIT // [facility] VISIT     Referring Doctor Roberto Sigala MD   Encounter Type Followup     CHIEF COMPLAINT     Visit Type Chronic   Symptoms None   Problems AS s/p TAVR, CAD, HTN, CHOL, NELY     HISTORY OF PRESENT ILLNESS      GEN - Doing well. Denies new concerns.  AS - s/p TAVR. No cp, sob, dizziness, syncope   CAD - Denies cp, sob, dizziness, syncope, palpitations.  HTN - Ambulatory BP readings in good range. No HA or dizziness.  CHOL - Last cholesterol reviewed and in good range.  NELY - s/p PPM 6/16. Denies dizziness, syncope. Interval interrogations reviewed.  OBESITY - Continued dietary indiscretions and lack of exercise. Massively overweight.  MED - Compliant with CV meds listed below without notable side effects     HISTORY/ALLERGIES/ROS     MedHx:  has a past medical history of Acute combined systolic and diastolic congestive heart failure ([address] Allergic, Anemia, Anxiety, Aortic stenosis, Arthritis, BPH (benign prostatic hypertrophy), Bradycardia, CAD (coronary artery disease), Depression, Diabetes mellitus ([address] Erectile dysfunction, Hyperlipidemia, Hypertension, Nasal defect, Obesity, ABDI (obstructive sleep apnea), Prostate CA ([address] Prostate cancer ([address] SOB (shortness of breath) on exertion, and Unspecified sleep apnea. SurgHx:  has a past surgical history that includes hernia repair; Appendectomy; Total shoulder arthroplasty; Colonoscopy; Cholecystectomy, laparoscopic (12/30/2011); Percutaneous Transluminal Coronary Angio; joint replacement; Tympanostomy tube placement (618016); Cosmetic surgery; fracture surgery; myringotomy (Left, 8/12/13); Tonsillectomy; and Anomalous venous return repair. SocHx:  reports that he has never smoked. He has never used smokeless tobacco. He reports that he does not drink alcohol or use drugs.    FamHx: family history includes Coronary Art Dis in an other family member; Early Death in his father; Heart Disease in his father; Sleep Apnea in his son. Allergies: Hydralazine hcl; Neosporin [neomycin-bacitracin zn-polymyx];  Sulfa antibiotics; and Codeine   ROS:  [x]Full ROS obtained and negative except as mentioned in HPI     MEDICATIONS      Current Outpatient Medications   Medication Sig Dispense Refill    carvedilol (COREG) 12.5 MG tablet TAKE 1 TABLET BY MOUTH TWICE DAILY 180 tablet 0    predniSONE (DELTASONE) 2.5 MG tablet Take 2.5 mg by mouth daily      gemfibrozil (LOPID) 600 MG tablet Take 600 mg by mouth 2 times daily (before meals)      fluticasone (FLONASE) 50 MCG/ACT nasal spray 1 spray by Nasal route daily 1 Bottle 1    fluticasone (FLONASE) 50 MCG/ACT nasal spray 2 sprays by Nasal route daily 1 Bottle 2    mirtazapine (REMERON) 15 MG tablet 1/2-1 po qHS, start 1/2 po qHS, can increase to 1 full tab po qHS if a week if needed 30 tablet 2    losartan (COZAAR) 25 MG tablet Take 0.5 tablets by mouth daily 45 tablet 2    carvedilol (COREG) 12.5 MG tablet TAKE 1 TABLET BY MOUTH TWICE DAILY 180 tablet 1    omeprazole (PRILOSEC) 20 MG delayed release capsule Take 20 mg by mouth daily  0    potassium chloride (MICRO-K) 10 MEQ extended release capsule Take 10 mEq by mouth daily      Multiple Vitamins-Minerals (THERAPEUTIC MULTIVITAMIN-MINERALS) tablet Take 1 tablet by mouth daily      Calcium Carbonate-Vit D-Min (CALCIUM-VITAMIN D-MINERALS) 600-800 MG-UNIT CHEW Take 1 tablet by mouth daily      spironolactone (ALDACTONE) 25 MG tablet Take 1 tablet by mouth daily 30 tablet 3    budesonide-formoterol (SYMBICORT) 160-4.5 MCG/ACT AERO Inhale 2 puffs into the lungs 2 times daily      glipiZIDE (GLUCOTROL XL) 2.5 MG extended release tablet Take 2.5 mg by mouth daily      aspirin 81 MG tablet Take 81 mg by mouth daily      pravastatin (PRAVACHOL) 40 MG tablet TAKE ONE TABLET BY MOUTH EVERY DAY AT BEDTIME 90 tablet 3     No current facility-administered medications for

## 2020-01-16 ENCOUNTER — OFFICE VISIT (OUTPATIENT)
Dept: CARDIOLOGY CLINIC | Age: 85
End: 2020-01-16
Payer: MEDICARE

## 2020-01-16 VITALS
OXYGEN SATURATION: 95 % | WEIGHT: 275 LBS | SYSTOLIC BLOOD PRESSURE: 130 MMHG | BODY MASS INDEX: 39.46 KG/M2 | HEART RATE: 60 BPM | DIASTOLIC BLOOD PRESSURE: 70 MMHG

## 2020-01-16 PROCEDURE — G8417 CALC BMI ABV UP PARAM F/U: HCPCS | Performed by: INTERNAL MEDICINE

## 2020-01-16 PROCEDURE — G8484 FLU IMMUNIZE NO ADMIN: HCPCS | Performed by: INTERNAL MEDICINE

## 2020-01-16 PROCEDURE — 4040F PNEUMOC VAC/ADMIN/RCVD: CPT | Performed by: INTERNAL MEDICINE

## 2020-01-16 PROCEDURE — 1036F TOBACCO NON-USER: CPT | Performed by: INTERNAL MEDICINE

## 2020-01-16 PROCEDURE — 1123F ACP DISCUSS/DSCN MKR DOCD: CPT | Performed by: INTERNAL MEDICINE

## 2020-01-16 PROCEDURE — 99214 OFFICE O/P EST MOD 30 MIN: CPT | Performed by: INTERNAL MEDICINE

## 2020-01-16 PROCEDURE — G8427 DOCREV CUR MEDS BY ELIG CLIN: HCPCS | Performed by: INTERNAL MEDICINE

## 2020-01-16 RX ORDER — CLOPIDOGREL BISULFATE 75 MG/1
75 TABLET ORAL DAILY
COMMUNITY
End: 2020-01-16 | Stop reason: ALTCHOICE

## 2020-01-16 RX ORDER — LEVOTHYROXINE SODIUM 0.03 MG/1
50 TABLET ORAL DAILY
COMMUNITY

## 2020-01-16 RX ORDER — MONTELUKAST SODIUM 10 MG/1
10 TABLET ORAL NIGHTLY
COMMUNITY

## 2020-01-16 RX ORDER — SERTRALINE HYDROCHLORIDE 25 MG/1
12.5 TABLET, FILM COATED ORAL DAILY
COMMUNITY

## 2020-01-27 NOTE — TELEPHONE ENCOUNTER
Spoke to pt son and he thinks that his dad (the pt) is not taking and said dont fill it, he will get over there and double check and call us back to confirm but he believes he is not on it

## 2020-01-28 ENCOUNTER — TELEPHONE (OUTPATIENT)
Dept: PULMONOLOGY | Age: 85
End: 2020-01-28

## 2020-01-28 ENCOUNTER — NURSE ONLY (OUTPATIENT)
Dept: CARDIOLOGY CLINIC | Age: 85
End: 2020-01-28

## 2020-01-28 RX ORDER — TRAZODONE HYDROCHLORIDE 50 MG/1
TABLET ORAL
Qty: 90 TABLET | Refills: 2 | OUTPATIENT
Start: 2020-01-28

## 2020-01-28 NOTE — TELEPHONE ENCOUNTER
Patients son Nicole Lois 89 called and said patient was taken off Trazodone a while back. Do not refill.

## 2020-01-28 NOTE — LETTER
[address] [place] [address] [phone]  [address] [place] [address]  HUYQJLL-[phone]  LYCB- [phone]    Pacemaker/Defibrillator Clinic          01/30/20        Narinder Dsouza  [address] [facility] [address] [facility] [address]        Dear Narinder Dsouza    This letter is to inform you that we did not receive your recently scheduled pacemaker and/or defibrillator, or implanted heart monitor, cellular or telephone transmission. At your earliest convenience, please send a transmission using your home monitor so we can ensure your cardiac device is functioning normally. For future transmissions, please keep in mind that we have an appointment date scheduled for you for this and staff allocated to receive your transmission on your scheduled day. You will receive a reminder to transmit call the day before from our office. If you are having problems sending your transmission, please call the toll free number on your equipment or below for assistance. [employer], Carelink    [phone]      Thank you.        Jacey [address]

## 2020-01-31 ENCOUNTER — TELEPHONE (OUTPATIENT)
Dept: PULMONOLOGY | Age: 85
End: 2020-01-31

## 2020-02-03 ENCOUNTER — TELEPHONE (OUTPATIENT)
Dept: PULMONOLOGY | Age: 85
End: 2020-02-03

## 2020-02-03 NOTE — TELEPHONE ENCOUNTER
Patient called and still feels like it's too much pressure. [phone] patient would like a return call.

## 2020-02-05 ENCOUNTER — TELEPHONE (OUTPATIENT)
Dept: PULMONOLOGY | Age: 85
End: 2020-02-05

## 2020-02-05 ENCOUNTER — TELEPHONE (OUTPATIENT)
Dept: PRIMARY CARE CLINIC | Age: 85
End: 2020-02-05

## 2020-02-18 ENCOUNTER — NURSE ONLY (OUTPATIENT)
Dept: CARDIOLOGY CLINIC | Age: 85
End: 2020-02-18
Payer: MEDICARE

## 2020-02-18 ENCOUNTER — TELEPHONE (OUTPATIENT)
Dept: PULMONOLOGY | Age: 85
End: 2020-02-18

## 2020-02-18 PROCEDURE — 93294 REM INTERROG EVL PM/LDLS PM: CPT | Performed by: INTERNAL MEDICINE

## 2020-02-18 PROCEDURE — 93296 REM INTERROG EVL PM/IDS: CPT | Performed by: INTERNAL MEDICINE

## 2020-02-19 ENCOUNTER — TELEPHONE (OUTPATIENT)
Dept: PULMONOLOGY | Age: 85
End: 2020-02-19

## 2020-02-19 RX ORDER — FLUTICASONE PROPIONATE 50 MCG
1 SPRAY, SUSPENSION (ML) NASAL DAILY
Qty: 1 BOTTLE | Refills: 1 | Status: SHIPPED | OUTPATIENT
Start: 2020-02-19

## 2020-04-08 ENCOUNTER — TELEPHONE (OUTPATIENT)
Dept: CARDIOLOGY CLINIC | Age: 85
End: 2020-04-08

## 2020-04-08 NOTE — TELEPHONE ENCOUNTER
PT called in stating that last night, he got up to go to the BR and his legs went numb when he went to sit on the toilet. Denied associated pain, no dizziness, SOB or LH. States that it has not happened again. We reviewed his CTA (pre TAVR) which showed no evidence of arterial narrowing or occlusion of his LE. We reviewed his MRI of spine which showed multilevel DDD. Advised him to discuss with his PCP for possible referral to neurology/neurosurgery. Pt states from a cardiac perspective, he is feeling well. Aware of upcoming OV with Dr. Theresa Potter in July, 2020.  Nick PINTO

## 2020-04-14 ENCOUNTER — VIRTUAL VISIT (OUTPATIENT)
Dept: PULMONOLOGY | Age: 85
End: 2020-04-14
Payer: MEDICARE

## 2020-04-14 PROCEDURE — 99442 PR PHYS/QHP TELEPHONE EVALUATION 11-20 MIN: CPT | Performed by: INTERNAL MEDICINE

## 2020-04-14 ASSESSMENT — SLEEP AND FATIGUE QUESTIONNAIRES
HOW LIKELY ARE YOU TO NOD OFF OR FALL ASLEEP WHILE SITTING AND READING: 0
ESS TOTAL SCORE: 1
HOW LIKELY ARE YOU TO NOD OFF OR FALL ASLEEP WHILE WATCHING TV: 0
HOW LIKELY ARE YOU TO NOD OFF OR FALL ASLEEP IN A CAR, WHILE STOPPED FOR A FEW MINUTES IN TRAFFIC: 0
HOW LIKELY ARE YOU TO NOD OFF OR FALL ASLEEP WHILE SITTING QUIETLY AFTER LUNCH WITHOUT ALCOHOL: 0
HOW LIKELY ARE YOU TO NOD OFF OR FALL ASLEEP WHILE SITTING INACTIVE IN A PUBLIC PLACE: 0
HOW LIKELY ARE YOU TO NOD OFF OR FALL ASLEEP WHILE SITTING AND TALKING TO SOMEONE: 0
HOW LIKELY ARE YOU TO NOD OFF OR FALL ASLEEP WHEN YOU ARE A PASSENGER IN A CAR FOR AN HOUR WITHOUT A BREAK: 0
HOW LIKELY ARE YOU TO NOD OFF OR FALL ASLEEP WHILE LYING DOWN TO REST IN THE AFTERNOON WHEN CIRCUMSTANCES PERMIT: 1

## 2020-05-06 ENCOUNTER — TELEPHONE (OUTPATIENT)
Dept: CARDIOLOGY CLINIC | Age: 85
End: 2020-05-06

## 2020-05-06 DIAGNOSIS — Z95.2 S/P TAVR (TRANSCATHETER AORTIC VALVE REPLACEMENT): Primary | ICD-10-CM

## 2020-05-06 NOTE — TELEPHONE ENCOUNTER
Can you get Mr. Vane Murillo scheduled for an echo either same day as his OV on 7/2/20 with [facility] or prior to at either [facility] or [facility]?  Nick PINTO

## 2020-05-08 ENCOUNTER — TELEPHONE (OUTPATIENT)
Dept: CARDIOLOGY CLINIC | Age: 85
End: 2020-05-08

## 2020-05-11 ENCOUNTER — OFFICE VISIT (OUTPATIENT)
Dept: CARDIOLOGY CLINIC | Age: 85
End: 2020-05-11
Payer: MEDICARE

## 2020-05-11 ENCOUNTER — HOSPITAL ENCOUNTER (OUTPATIENT)
Dept: ONCOLOGY | Age: 85
Setting detail: INFUSION SERIES
Discharge: HOME | End: 2020-05-11
Payer: MEDICARE

## 2020-05-11 VITALS
BODY MASS INDEX: 39.16 KG/M2 | SYSTOLIC BLOOD PRESSURE: 136 MMHG | OXYGEN SATURATION: 96 % | WEIGHT: 273.5 LBS | HEART RATE: 61 BPM | DIASTOLIC BLOOD PRESSURE: 74 MMHG | HEIGHT: 70 IN

## 2020-05-11 VITALS
SYSTOLIC BLOOD PRESSURE: 184 MMHG | TEMPERATURE: 96.9 F | RESPIRATION RATE: 16 BRPM | DIASTOLIC BLOOD PRESSURE: 78 MMHG | HEART RATE: 60 BPM

## 2020-05-11 LAB
ANION GAP SERPL CALCULATED.3IONS-SCNC: 11 MMOL/L (ref 3–16)
BUN BLDV-MCNC: 29 MG/DL (ref 7–20)
CALCIUM SERPL-MCNC: 10 MG/DL (ref 8.3–10.6)
CHLORIDE BLD-SCNC: 101 MMOL/L (ref 99–110)
CO2: 23 MMOL/L (ref 21–32)
CREAT SERPL-MCNC: 1.2 MG/DL (ref 0.8–1.3)
GFR AFRICAN AMERICAN: >60
GFR NON-AFRICAN AMERICAN: 57
GLUCOSE BLD-MCNC: 161 MG/DL (ref 70–99)
HCT VFR BLD CALC: 39.7 % (ref 40.5–52.5)
HEMOGLOBIN: 13.6 G/DL (ref 13.5–17.5)
MCH RBC QN AUTO: 31.2 PG (ref 26–34)
MCHC RBC AUTO-ENTMCNC: 34.2 G/DL (ref 31–36)
MCV RBC AUTO: 91.3 FL (ref 80–100)
PDW BLD-RTO: 15 % (ref 12.4–15.4)
PLATELET # BLD: 215 K/UL (ref 135–450)
PMV BLD AUTO: 8.8 FL (ref 5–10.5)
POTASSIUM SERPL-SCNC: 4.3 MMOL/L (ref 3.5–5.1)
PRO-BNP: 868 PG/ML (ref 0–449)
RBC # BLD: 4.35 M/UL (ref 4.2–5.9)
SODIUM BLD-SCNC: 135 MMOL/L (ref 136–145)
WBC # BLD: 7.6 K/UL (ref 4–11)

## 2020-05-11 PROCEDURE — 80048 BASIC METABOLIC PNL TOTAL CA: CPT

## 2020-05-11 PROCEDURE — G8417 CALC BMI ABV UP PARAM F/U: HCPCS | Performed by: CLINICAL NURSE SPECIALIST

## 2020-05-11 PROCEDURE — 83880 ASSAY OF NATRIURETIC PEPTIDE: CPT

## 2020-05-11 PROCEDURE — G8427 DOCREV CUR MEDS BY ELIG CLIN: HCPCS | Performed by: CLINICAL NURSE SPECIALIST

## 2020-05-11 PROCEDURE — 4040F PNEUMOC VAC/ADMIN/RCVD: CPT | Performed by: CLINICAL NURSE SPECIALIST

## 2020-05-11 PROCEDURE — 85027 COMPLETE CBC AUTOMATED: CPT

## 2020-05-11 PROCEDURE — 1123F ACP DISCUSS/DSCN MKR DOCD: CPT | Performed by: CLINICAL NURSE SPECIALIST

## 2020-05-11 PROCEDURE — 99215 OFFICE O/P EST HI 40 MIN: CPT | Performed by: CLINICAL NURSE SPECIALIST

## 2020-05-11 PROCEDURE — 6360000002 HC RX W HCPCS: Performed by: CLINICAL NURSE SPECIALIST

## 2020-05-11 PROCEDURE — 1036F TOBACCO NON-USER: CPT | Performed by: CLINICAL NURSE SPECIALIST

## 2020-05-11 PROCEDURE — 99201 HC NEW PT, OUTPT VISIT LEVEL 1: CPT

## 2020-05-11 PROCEDURE — 96374 THER/PROPH/DIAG INJ IV PUSH: CPT

## 2020-05-11 RX ORDER — PREDNISONE 1 MG/1
2.5 TABLET ORAL 2 TIMES DAILY
COMMUNITY
Start: 2020-03-27 | End: 2020-09-17 | Stop reason: DRUGHIGH

## 2020-05-11 RX ORDER — POTASSIUM CHLORIDE 750 MG/1
10 CAPSULE, EXTENDED RELEASE ORAL DAILY
Qty: 90 CAPSULE | Refills: 0 | Status: CANCELLED | OUTPATIENT
Start: 2020-05-11

## 2020-05-11 RX ORDER — CARVEDILOL 12.5 MG/1
TABLET ORAL
Qty: 180 TABLET | Refills: 1 | Status: CANCELLED | OUTPATIENT
Start: 2020-05-11

## 2020-05-11 RX ORDER — TRAZODONE HYDROCHLORIDE 50 MG/1
1 TABLET ORAL NIGHTLY
COMMUNITY
Start: 2020-03-27

## 2020-05-11 RX ORDER — CLOPIDOGREL BISULFATE 75 MG/1
1 TABLET ORAL DAILY
COMMUNITY
Start: 2020-03-27 | End: 2020-07-02 | Stop reason: ALTCHOICE

## 2020-05-11 RX ORDER — SPIRONOLACTONE 25 MG/1
25 TABLET ORAL DAILY
Qty: 30 TABLET | Refills: 3 | Status: CANCELLED | OUTPATIENT
Start: 2020-05-11

## 2020-05-11 RX ORDER — SODIUM CHLORIDE 0.9 % (FLUSH) 0.9 %
10 SYRINGE (ML) INJECTION ONCE
Status: DISCONTINUED | OUTPATIENT
Start: 2020-05-11 | End: 2020-05-12 | Stop reason: HOSPADM

## 2020-05-11 RX ORDER — FUROSEMIDE 10 MG/ML
120 INJECTION INTRAMUSCULAR; INTRAVENOUS ONCE
Status: COMPLETED | OUTPATIENT
Start: 2020-05-11 | End: 2020-05-11

## 2020-05-11 RX ORDER — FUROSEMIDE 40 MG/1
40 TABLET ORAL 2 TIMES DAILY
COMMUNITY
End: 2020-05-12

## 2020-05-11 RX ADMIN — FUROSEMIDE 120 MG: 10 INJECTION, SOLUTION INTRAMUSCULAR; INTRAVENOUS at 13:35

## 2020-05-11 NOTE — PROGRESS NOTES
Patient to department for outpatient labs and lasix. Here from cardiology office. Labs drawn followed by lasix  120 mg at 20 mg per minute. Tolerated well. AVS printed and reviewed. Patient aware that he will have urinary urgency and frequency. Also aware that this tx may lower his b/p and he may feel dizzy upon standing. All questions answered. To follow up with cardiology office. Patient discharged with son per W/C.

## 2020-05-11 NOTE — PROGRESS NOTES
[address]  Progress Note    Primary Care Doctor:  Sol Minaya MD    Chief Complaint   Patient presents with    Check-Up    Shortness of Breath     with exertion    Fatigue     weakness    Dizziness     \"all the time\"         History of Present Illness:  80 y.o. male with history of 80year old male with history of chronic combined s/dHF, HTN, DM, CAD, ABDI on cpap, AS s/p TAVR 6/18. [facility] 5/18 with non obstructive CAD. Hospitalized 3/15-18 with weakness and fatigue, treated for HF exacerbation possibly secondary to indiscretion with dietary and fluids. Discharge weight was 261. Lisinopril was changed to losartan due to cough    I had the pleasure of seeing Shira Giordano in follow up for increased sob and weakness. He is in a wheel chair with his son, Justina Hernandez. I have not seen him in over a year. Weight is up 261->273. He does not know his medications and has an old list in his pocket. He has been on prednisone since 6/19 ? for his breathing. We spoke to [address] at the pharmacy and many medications were incorrect on his med list.  He continues to be non compliant with his diet. He denies any palpitations and edema. He complains of his lower back pain and not being able to do for himself. Past Medical History:   has a past medical history of Acute combined systolic and diastolic congestive heart failure ([address] Allergic, Anemia, Anxiety, Aortic stenosis, Arthritis, BPH (benign prostatic hypertrophy), Bradycardia, CAD (coronary artery disease), Depression, Diabetes mellitus ([address] Erectile dysfunction, Hyperlipidemia, Hypertension, Nasal defect, Obesity, ABDI (obstructive sleep apnea), Prostate CA ([address] Prostate cancer ([address] SOB (shortness of breath) on exertion, and Unspecified sleep apnea. Surgical History:   has a past surgical history that includes hernia repair; Appendectomy; Total shoulder arthroplasty; Colonoscopy; Cholecystectomy, laparoscopic (12/30/2011);  Percutaneous 3/19/19  Yes Christal Easton APRN - CNP   glipiZIDE (GLUCOTROL XL) 2.5 MG extended release tablet Take 2.5 mg by mouth daily   Yes Historical Provider, MD   aspirin 81 MG tablet Take 81 mg by mouth daily   Yes Historical Provider, MD   pravastatin (PRAVACHOL) 40 MG tablet TAKE ONE TABLET BY MOUTH EVERY DAY AT BEDTIME 8/7/14  Yes Charles Boucher MD        Allergies:  Hydralazine hcl; Neosporin [neomycin-bacitracin zn-polymyx]; Sulfa antibiotics; and Codeine     Review of Systems:   · Constitutional: there has been no unanticipated weight loss. There's been no change in energy level, sleep pattern, or activity level. SOB, weakness in his legs  · Eyes: No visual changes or diplopia. No scleral icterus. · ENT: No Headaches, hearing loss or vertigo. No mouth sores or sore throat. · Cardiovascular: Reviewed in HPI  · Respiratory: No cough or wheezing, no sputum production. No hematemesis. · Gastrointestinal: No abdominal pain, appetite loss, blood in stools. No change in bowel or bladder habits. · Genitourinary: No dysuria, trouble voiding, or hematuria. · Musculoskeletal:  No gait disturbance, weakness or joint complaints. · Integumentary: No rash or pruritis. · Neurological: No headache, diplopia, change in muscle strength, numbness or tingling. No change in gait, balance, coordination, mood, affect, memory, mentation, behavior. · Psychiatric: No anxiety, no depression. · Endocrine: No malaise, fatigue or temperature intolerance. No excessive thirst, fluid intake, or urination. No tremor. · Hematologic/Lymphatic: No abnormal bruising or bleeding, blood clots or swollen lymph nodes. · Allergic/Immunologic: No nasal congestion or hives.     Physical Examination:    Vitals:    05/11/20 1137 05/11/20 1145   BP: (!) 152/68 136/74   Site: Left Upper Arm Left Upper Arm   Position: Sitting Sitting   Cuff Size: Large Adult Large Adult   Pulse: 61    SpO2: 96%    Weight: 273 lb 8 oz (124.1 kg)    Height: 5' 10\" (1.778 m)         Constitutional and General Appearance: Warm and dry, no apparent distress, normal coloration  HEENT:  Normocephalic, atraumatic  Respiratory:  · Normal excursion and expansion without use of accessory muscles  · Resp Auscultation: Normal breath sounds without dullness  Cardiovascular:  · The apical impulses not displaced  · Heart tones are crisp and normal  · JVP normal cm H2O  · Regular rate and rhythm  · Peripheral pulses are symmetrical and full  · There is no clubbing, cyanosis of the extremities.   · Slight lower ankle edema  · Pedal Pulses: 2+ and equal   Abdomen: obese  · No masses or tenderness  · Liver/Spleen: No Abnormalities Noted  Neurological/Psychiatric:  · Alert and oriented in all spheres  · Moves all extremities well  · Exhibits normal gait balance and coordination  · No abnormalities of mood, affect, memory, mentation, or behavior are noted    Lab Data:    CBC:   Lab Results   Component Value Date    WBC 7.0 03/15/2019    WBC 8.0 08/30/2018    WBC 6.3 07/19/2018    RBC 4.53 03/15/2019    RBC 4.46 08/30/2018    RBC 4.65 07/19/2018    RBC 4.41 05/29/2015    HGB 13.6 03/15/2019    HGB 13.6 08/30/2018    HGB 14.3 07/19/2018    HCT 39.3 03/15/2019    HCT 39.3 08/30/2018    HCT 42.0 07/19/2018    MCV 86.8 03/15/2019    MCV 88.1 08/30/2018    MCV 90.3 07/19/2018    RDW 13.9 03/15/2019    RDW 13.6 08/30/2018    RDW 14.2 07/19/2018     03/15/2019     08/30/2018     07/19/2018     BMP:  Lab Results   Component Value Date     05/13/2019     04/11/2019     04/04/2019    K 4.1 05/13/2019    K 3.7 04/11/2019    K 3.7 04/04/2019    K 5.4 06/13/2018    CL 98 05/13/2019    CL 96 04/11/2019    CL 95 04/04/2019    CO2 25 05/13/2019    CO2 22 04/11/2019    CO2 22 04/04/2019    PHOS 3.8 01/04/2018    BUN 28 05/13/2019    BUN 36 04/11/2019    BUN 38 04/04/2019    CREATININE 1.2 05/13/2019    CREATININE 1.4 04/11/2019    CREATININE 1.6 04/04/2019     BNP:   Lab Results   Component Value Date    PROBNP 607 05/13/2019    PROBNP 716 04/11/2019    PROBNP 605 04/04/2019     Cardiac Imaging:  Echo 7/18/2019  Left ventricle - normal size, moderate concentric LVH, reduced function   with EF 45%, abnormal septal motion  Mitral valve - annular calcification, mild regurgitation  Left atrium - dilated  Aortic valve - well seated TAVR valve with mean gradient of 18mmHg, no   regurgitation  Tricuspid valve - mild regurgitation with PASP of 26mmHg  Pacemaker / ICD lead is visualized in the right atrium. Echo 3/18/2019:  Summary   - S/P TAVR 6/12/2018.   -Left ventricular cavity size is normal with moderately severe concentric left ventricular hypertrophy.   -Overall left ventricular systolic function appears moderately reduced with an ejection fraction on the 30-35% range.   -There is abnormal septal motion possible due to RV pacing.   -Grade I diastolic dysfunction with normal LV filling pressures.   -Mitral annular calcification is present.   -Mild mitral regurgitation.   -A bioprosthetic artificial aortic valve appears well seated with a maximum   velocity of 2.7m/s and a mean gradient of 16mmHg.   -Mild tricuspid regurgitation.   -The left atrium is mildly dilated.     Echo 7/18/2018:  Summary   -Left ventricular cavity size is normal.   -There is severe concentric left ventricular hypertrophy.   -Overall left ventricular systolic function appears moderately reduced with   an ejection fraction on the 35-40% range.   -Abnormal (paradoxical) septal motion is present.   -Grade II diastolic dysfunction with elevated LV filling pressures. E/e\"=17.4   -Mitral annular calcification is present.   -Mild to moderate mitral regurgitation.   -A bioprosthetic artificial aortic valve appears well seated with a maximum   velocity of 2.43m/s and a mean gradient of 12mmHg.   -Mild tricuspid regurgitation with RVSP of 38 mmHg.   -Dilated left atrium with a volume of 80 ml.   -The right ventricle is

## 2020-05-12 ENCOUNTER — TELEPHONE (OUTPATIENT)
Dept: CARDIOLOGY CLINIC | Age: 85
End: 2020-05-12

## 2020-05-12 RX ORDER — FUROSEMIDE 40 MG/1
TABLET ORAL
Qty: 270 TABLET | Refills: 0
Start: 2020-05-12 | End: 2020-05-27 | Stop reason: DRUGHIGH

## 2020-05-12 NOTE — TELEPHONE ENCOUNTER
Son answered and stated that he spoke with patient and the patient did void a lot last night. Suggested I call  [phone] to get more information from the patient. Patient contacted and he voided 3 of the urinals last night he stated. He has not gotten his weight. Feeling a little better. Instructed on lasix dosing and he wrote it down. Stated his \"\" already left for the day so he will start that tomorrow. I told him that I also let his son know of the lasix dosing and that he is to take the 60/40 until he sees [id number] [place] [address] on 5/27/20.   Verbalized understanding

## 2020-05-12 NOTE — TELEPHONE ENCOUNTER
----- Message from NIKHIL Adhikari - CNS sent at 5/12/2020  8:43 AM EDT -----  Please call him and see if he feels better since he received the IV lasix yesterday  He should increase his lasix to 60 mg in am and 40 mg in pm until I see him in 2 weeks  thanks

## 2020-05-15 RX ORDER — FUROSEMIDE 20 MG/1
TABLET ORAL
Qty: 150 TABLET | Refills: 0 | Status: SHIPPED | OUTPATIENT
Start: 2020-05-15 | End: 2020-05-18

## 2020-05-15 NOTE — TELEPHONE ENCOUNTER
Pharmacy states pt is requesting 20 mg tabs for furosemide so he does not have to cut them in half.  Please send to [address],  [address] [phone] - f [phone]

## 2020-05-19 ENCOUNTER — NURSE ONLY (OUTPATIENT)
Dept: CARDIOLOGY CLINIC | Age: 85
End: 2020-05-19
Payer: MEDICARE

## 2020-05-19 PROCEDURE — 93296 REM INTERROG EVL PM/IDS: CPT | Performed by: INTERNAL MEDICINE

## 2020-05-19 PROCEDURE — 93294 REM INTERROG EVL PM/LDLS PM: CPT | Performed by: INTERNAL MEDICINE

## 2020-05-19 NOTE — LETTER
[address] [facility] [phone]  [address] [facility]    Pacemaker/Defibrillator Clinic          05/21/20        Alexis Marrufo  [address] [facility],[address] [place] [address]        Dear Alexis Marrufo    This letter is to inform you that we received the transmission from your monitor at home that checks your implanted heart device. The next date you should transmit will be 8/18. If your report requires attention, we will notify you. Please be aware that the remote device transmission sites are periodically monitored only during regular business hours during which simultaneous in-office device clinics are being run. If your transmission requires attention, we will contact you as soon as possible. Thank you.             [facility], [place]

## 2020-05-22 NOTE — PROGRESS NOTES
Carelink transmission shows normal sensing and pacing function. See interrogation for more details.  99%. No  arrhythmias recorded. Follow up in 3 months via carelink.

## 2020-05-27 ENCOUNTER — OFFICE VISIT (OUTPATIENT)
Dept: CARDIOLOGY CLINIC | Age: 85
End: 2020-05-27
Payer: MEDICARE

## 2020-05-27 ENCOUNTER — HOSPITAL ENCOUNTER (OUTPATIENT)
Age: 85
Discharge: HOME | End: 2020-05-27
Payer: MEDICARE

## 2020-05-27 VITALS
BODY MASS INDEX: 38.94 KG/M2 | OXYGEN SATURATION: 95 % | SYSTOLIC BLOOD PRESSURE: 136 MMHG | HEIGHT: 70 IN | WEIGHT: 272 LBS | HEART RATE: 63 BPM | DIASTOLIC BLOOD PRESSURE: 64 MMHG

## 2020-05-27 LAB
ANION GAP SERPL CALCULATED.3IONS-SCNC: 17 MMOL/L (ref 3–16)
BUN BLDV-MCNC: 36 MG/DL (ref 7–20)
CALCIUM SERPL-MCNC: 9.7 MG/DL (ref 8.3–10.6)
CHLORIDE BLD-SCNC: 94 MMOL/L (ref 99–110)
CO2: 24 MMOL/L (ref 21–32)
CREAT SERPL-MCNC: 1.3 MG/DL (ref 0.8–1.3)
GFR AFRICAN AMERICAN: >60
GFR NON-AFRICAN AMERICAN: 52
GLUCOSE BLD-MCNC: 161 MG/DL (ref 70–99)
POTASSIUM SERPL-SCNC: 4.4 MMOL/L (ref 3.5–5.1)
PRO-BNP: 669 PG/ML (ref 0–449)
PROSTATE SPECIFIC ANTIGEN: 1.24 NG/ML (ref 0–4)
SODIUM BLD-SCNC: 135 MMOL/L (ref 136–145)

## 2020-05-27 PROCEDURE — G8417 CALC BMI ABV UP PARAM F/U: HCPCS | Performed by: CLINICAL NURSE SPECIALIST

## 2020-05-27 PROCEDURE — 1036F TOBACCO NON-USER: CPT | Performed by: CLINICAL NURSE SPECIALIST

## 2020-05-27 PROCEDURE — 1123F ACP DISCUSS/DSCN MKR DOCD: CPT | Performed by: CLINICAL NURSE SPECIALIST

## 2020-05-27 PROCEDURE — G8427 DOCREV CUR MEDS BY ELIG CLIN: HCPCS | Performed by: CLINICAL NURSE SPECIALIST

## 2020-05-27 PROCEDURE — 4040F PNEUMOC VAC/ADMIN/RCVD: CPT | Performed by: CLINICAL NURSE SPECIALIST

## 2020-05-27 PROCEDURE — 80048 BASIC METABOLIC PNL TOTAL CA: CPT

## 2020-05-27 PROCEDURE — 84153 ASSAY OF PSA TOTAL: CPT

## 2020-05-27 PROCEDURE — 36415 COLL VENOUS BLD VENIPUNCTURE: CPT

## 2020-05-27 PROCEDURE — 99214 OFFICE O/P EST MOD 30 MIN: CPT | Performed by: CLINICAL NURSE SPECIALIST

## 2020-05-27 PROCEDURE — 83880 ASSAY OF NATRIURETIC PEPTIDE: CPT

## 2020-05-27 RX ORDER — FUROSEMIDE 40 MG/1
TABLET ORAL
Qty: 270 TABLET | Refills: 0 | Status: SHIPPED
Start: 2020-05-27

## 2020-05-27 NOTE — PROGRESS NOTES
thirst, fluid intake, or urination. No tremor. · Hematologic/Lymphatic: No abnormal bruising or bleeding, blood clots or swollen lymph nodes. · Allergic/Immunologic: No nasal congestion or hives. Physical Examination:    Vitals:    05/27/20 1243   BP: 136/64   Site: Right Upper Arm   Position: Sitting   Cuff Size: Large Adult   Pulse: 63   SpO2: 95%   Weight: 272 lb (123.4 kg)   Height: 5' 10\" (1.778 m)        Constitutional and General Appearance: Warm and dry, no apparent distress, normal coloration  HEENT:  Normocephalic, atraumatic  Respiratory:  · Normal excursion and expansion without use of accessory muscles  · Resp Auscultation: Normal breath sounds without dullness  Cardiovascular:  · The apical impulses not displaced  · Heart tones are crisp and normal  · JVP normal cm H2O  · Regular rate and rhythm  · Peripheral pulses are symmetrical and full  · There is no clubbing, cyanosis of the extremities.   · No ankle edema  · Pedal Pulses: 2+ and equal   Abdomen: obese  · No masses or tenderness  · Liver/Spleen: No Abnormalities Noted  Neurological/Psychiatric:  · Alert and oriented in all spheres  · Moves all extremities well  · Exhibits normal gait balance and coordination  · No abnormalities of mood, affect, memory, mentation, or behavior are noted    Lab Data:    CBC:   Lab Results   Component Value Date    WBC 7.6 05/11/2020    WBC 7.0 03/15/2019    WBC 8.0 08/30/2018    RBC 4.35 05/11/2020    RBC 4.53 03/15/2019    RBC 4.46 08/30/2018    RBC 4.41 05/29/2015    HGB 13.6 05/11/2020    HGB 13.6 03/15/2019    HGB 13.6 08/30/2018    HCT 39.7 05/11/2020    HCT 39.3 03/15/2019    HCT 39.3 08/30/2018    MCV 91.3 05/11/2020    MCV 86.8 03/15/2019    MCV 88.1 08/30/2018    RDW 15.0 05/11/2020    RDW 13.9 03/15/2019    RDW 13.6 08/30/2018     05/11/2020     03/15/2019     08/30/2018     BMP:  Lab Results   Component Value Date     05/11/2020     05/13/2019     04/11/2019    K 4.3 05/11/2020    K 4.1 05/13/2019    K 3.7 04/11/2019    K 5.4 06/13/2018     05/11/2020    CL 98 05/13/2019    CL 96 04/11/2019    CO2 23 05/11/2020    CO2 25 05/13/2019    CO2 22 04/11/2019    PHOS 3.8 01/04/2018    BUN 29 05/11/2020    BUN 28 05/13/2019    BUN 36 04/11/2019    CREATININE 1.2 05/11/2020    CREATININE 1.2 05/13/2019    CREATININE 1.4 04/11/2019     BNP:   Lab Results   Component Value Date    PROBNP 868 05/11/2020    PROBNP 607 05/13/2019    PROBNP 716 04/11/2019     Cardiac Imaging:  Echo 7/18/2019  Left ventricle - normal size, moderate concentric LVH, reduced function   with EF 45%, abnormal septal motion  Mitral valve - annular calcification, mild regurgitation  Left atrium - dilated  Aortic valve - well seated TAVR valve with mean gradient of 18mmHg, no   regurgitation  Tricuspid valve - mild regurgitation with PASP of 26mmHg  Pacemaker / ICD lead is visualized in the right atrium.     Echo 3/18/2019:  Summary   - S/P TAVR 6/12/2018.   -Left ventricular cavity size is normal with moderately severe concentric left ventricular hypertrophy.   -Overall left ventricular systolic function appears moderately reduced with an ejection fraction on the 30-35% range.   -There is abnormal septal motion possible due to RV pacing.   -Grade I diastolic dysfunction with normal LV filling pressures.   -Mitral annular calcification is present.   -Mild mitral regurgitation.   -A bioprosthetic artificial aortic valve appears well seated with a maximum   velocity of 2.7m/s and a mean gradient of 16mmHg.   -Mild tricuspid regurgitation.   -The left atrium is mildly dilated.     Echo 7/18/2018:  Summary   -Left ventricular cavity size is normal.   -There is severe concentric left ventricular hypertrophy.   -Overall left ventricular systolic function appears moderately reduced with   an ejection fraction on the 35-40% range.   -Abnormal (paradoxical) septal motion is present.   -Grade II diastolic dysfunction with elevated LV filling pressures. E/e\"=17.4   -Mitral annular calcification is present.   -Mild to moderate mitral regurgitation.   -A bioprosthetic artificial aortic valve appears well seated with a maximum   velocity of 2.43m/s and a mean gradient of 12mmHg.   -Mild tricuspid regurgitation with RVSP of 38 mmHg.   -Dilated left atrium with a volume of 80 ml.   -The right ventricle is mildly enlarged.   -Pacer / ICD wire is visualized in the right heart.   -The right atrium appears mildly to moderately dilated. [place] RETREAT 5/15/2018: Findings:                      LM       Normal              LAD     50% mid               Cx        50% mid              RCA     50% mid              LVG     Not performed              EDP     Not obtained  Intervention:  None    Assessment:    1. Chronic combined systolic and diastolic heart failure (HCC)on arb and aldactone antagonist    2. Coronary artery disease involving native coronary artery of native heart without angina pectoris    3. Essential hypertension    4. S/P TAVR (transcatheter aortic valve replacement)    5. ABDI (obstructive sleep apnea)    6. Cardiomyopathy, unspecified type ([address]    7. Non compliance    Plan:   Patient Instructions   1. Talk with Dr Bonnie Macdonald regarding prednisone and depression  2. Continue all current medications  3. Call [facility] regarding assistance needed [phone]  4. Check blood work today. May need to increase lasix further. Discussed compliance with diet  5. Low sodium diet <2000 mg and <64 ounces fluid a day. Information given to patient on restaurants  6. RTO July 2nd at 1030 for echo and Dr Mckay Chol (? Need for plavix)    Long discussion regarding dietary changes which he does not understand    I appreciate the opportunity of cooperating in the care of this individual.    JUSTIN Apple, 5/27/2020, 12:55 PM    QUALITY MEASURES  1. Tobacco Cessation Counseling: na  2. Retake of BP if >140/90:  na  3.  Documentation to PCP/referring

## 2020-05-28 ENCOUNTER — TELEPHONE (OUTPATIENT)
Dept: CARDIOLOGY CLINIC | Age: 85
End: 2020-05-28

## 2020-05-28 NOTE — TELEPHONE ENCOUNTER
----- Message from NIKHIL Avendaño - CNS sent at 5/28/2020  8:33 AM EDT -----  Fluid level is improved on his labs  Continue current plan as discussed in OV yesterday  thanks

## 2020-06-02 ENCOUNTER — TELEPHONE (OUTPATIENT)
Dept: PULMONOLOGY | Age: 85
End: 2020-06-02

## 2020-07-01 NOTE — PROGRESS NOTES
[address]     H+P // CONSULT // OUTPATIENT VISIT // [facility] VISIT     Referring Doctor Sarah Mayo MD   Encounter Type Followup     CHIEF COMPLAINT     Visit Type Chronic   Symptoms None   Problems AS s/p TAVR, CAD, HTN, CHOL, NELY     HISTORY OF PRESENT ILLNESS      GEN - Doing well. Denies new concerns.  AS - s/p TAVR. No cp, sob, dizziness, syncope   CAD - Denies cp, sob, dizziness, syncope, palpitations.  HTN - Ambulatory BP readings in good range. No HA or dizziness.  CHOL - Last cholesterol reviewed and in good range. Tolerating statin.  NELY - s/p PPM 6/16. Denies dizziness, syncope. Interval interrogations reviewed.  OBESITY - Continued dietary indiscretions and lack of exercise. Massively overweight.  MED - Compliant with CV meds listed below without notable side effects     HISTORY/ALLERGIES/ROS     MedHx:  has a past medical history of Acute combined systolic and diastolic congestive heart failure ([address] Allergic, Anemia, Anxiety, Aortic stenosis, Arthritis, BPH (benign prostatic hypertrophy), Bradycardia, CAD (coronary artery disease), Depression, Diabetes mellitus ([address] Erectile dysfunction, Hyperlipidemia, Hypertension, Nasal defect, Obesity, ABDI (obstructive sleep apnea), Prostate CA ([address] Prostate cancer ([address] SOB (shortness of breath) on exertion, and Unspecified sleep apnea. SurgHx:  has a past surgical history that includes hernia repair; Appendectomy; Total shoulder arthroplasty; Colonoscopy; Cholecystectomy, laparoscopic (12/30/2011); Percutaneous Transluminal Coronary Angio; joint replacement; Tympanostomy tube placement ([id number]); Cosmetic surgery; fracture surgery; myringotomy (Left, 8/12/13); Tonsillectomy; and Anomalous venous return repair. SocHx:  reports that he has never smoked. He has never used smokeless tobacco. He reports that he does not drink alcohol or use drugs.    FamHx: family history includes Coronary Art Dis in an Reviewed with patient and will remain unchanged except as mentioned in A/P  PHYSICAL EXAM     Vitals:    07/02/20 1104   BP: 132/66   Pulse: 60   SpO2: 97%       Gen Alert, coop, no distress Heart  Rrr, 1/6   Head NC, AT, no abnorm Abd  Soft, NT, +BS, no mass, no OM   Eyes PER, conj/corn clear Ext  Ext nl, AT, no C/C/E   Nose Nares nl, no drain, NT Pulse 2+ and symmetric   Throat Lips, mucosa, tongue nl Skin Col/text/turg nl, no vis rash/les   Neck S/S, TM, NT, no bruit/JVD Psych Nl mood and affect   Lung CTA-B, unlabored, no DTP Lymph   No cervical or axillary LA   Ch wall NT, no deform Neuro  Nl gross M/S exam     ASSESSMENT AND PLAN      *AS   Date EF Detail   Sx   No concerning   Hx 6/18  TAVR 29 mm ES3   NYHA   II   TTE 12/17 6/18 7/18 7/19 7/20 40%  40%  40%  45% AS MG 37  TAVR MG 9  TAVR MG 12mmHg  TAVR MG 18   STTE 5/18 40% Sev AS MG 46   Plan   Doing well. No concerning sx   *CAD   Date EF Results   Sx   No concerning   LHC 5/18  50% mid LAD, 50% mid Cx, 50% mid RCA Jacinta Pichardo)   MPI 12/17 35% Fixed inferior apical defect, no ischemia   Plan   Continue aggressive medical treatment at doses above   *BRADYCARDIA  Hx PPM prior to TAVR 6/16   Plan Per EP  *OBESITY  Status Uncontrolled with a BMI of Body mass index is 39.16 kg/m². Plan Counseled on diet, exercise, weight loss options in detail  *COMPLIANCE  Status Compliant  Plan Discussed importance of compliance with meds/diet/salt/exercise; avoid tob/alc/drugs; patient verbalized understanding  *FOLLOWUP  3 months with [facility] Sindy Adams, am scribing for and in the presence of Tawana Watts MD.   Sindy Rodarte 07/01/20 9:09 AM   Provider Brittany Forde is working as a scribe for and in the presence of me (Tawana Watts MD). Working as a scribe, Sindy Mata may have prepopulated components of this note with my historical  intellectual property under my direct supervision.   Any additions to this

## 2020-07-02 ENCOUNTER — OFFICE VISIT (OUTPATIENT)
Dept: CARDIOLOGY CLINIC | Age: 85
End: 2020-07-02
Payer: MEDICARE

## 2020-07-02 ENCOUNTER — HOSPITAL ENCOUNTER (OUTPATIENT)
Dept: NON INVASIVE DIAGNOSTICS | Age: 85
Discharge: HOME | End: 2020-07-02
Payer: MEDICARE

## 2020-07-02 VITALS
HEART RATE: 60 BPM | OXYGEN SATURATION: 97 % | SYSTOLIC BLOOD PRESSURE: 132 MMHG | WEIGHT: 272.9 LBS | DIASTOLIC BLOOD PRESSURE: 66 MMHG | BODY MASS INDEX: 39.16 KG/M2

## 2020-07-02 LAB
LV EF: 40 %
LVEF MODALITY: NORMAL

## 2020-07-02 PROCEDURE — G8427 DOCREV CUR MEDS BY ELIG CLIN: HCPCS | Performed by: INTERNAL MEDICINE

## 2020-07-02 PROCEDURE — 1036F TOBACCO NON-USER: CPT | Performed by: INTERNAL MEDICINE

## 2020-07-02 PROCEDURE — 99214 OFFICE O/P EST MOD 30 MIN: CPT | Performed by: INTERNAL MEDICINE

## 2020-07-02 PROCEDURE — 1123F ACP DISCUSS/DSCN MKR DOCD: CPT | Performed by: INTERNAL MEDICINE

## 2020-07-02 PROCEDURE — 4040F PNEUMOC VAC/ADMIN/RCVD: CPT | Performed by: INTERNAL MEDICINE

## 2020-07-02 PROCEDURE — 93306 TTE W/DOPPLER COMPLETE: CPT

## 2020-07-02 PROCEDURE — G8417 CALC BMI ABV UP PARAM F/U: HCPCS | Performed by: INTERNAL MEDICINE

## 2020-07-02 NOTE — LETTER
laparoscopic (12/30/2011); Percutaneous Transluminal Coronary Angio; joint replacement; Tympanostomy tube placement ([id number]); Cosmetic surgery; fracture surgery; myringotomy (Left, 8/12/13); Tonsillectomy; and Anomalous venous return repair. SocHx:  reports that he has never smoked. He has never used smokeless tobacco. He reports that he does not drink alcohol or use drugs. FamHx: family history includes Coronary Art Dis in an other family member; Early Death in his father; Heart Disease in his father; Sleep Apnea in his son. Allergies: Hydralazine hcl; Neosporin [neomycin-bacitracin zn-polymyx];  Sulfa antibiotics; and Codeine   ROS:  [x]Full ROS obtained and negative except as mentioned in HPI     MEDICATIONS      Current Outpatient Medications   Medication Sig Dispense Refill    furosemide (LASIX) 40 MG tablet 80 mg in am and 40 mg 270 tablet 0    clopidogrel (PLAVIX) 75 MG tablet Take 1 tablet by mouth daily      traZODone (DESYREL) 50 MG tablet Take 1 tablet by mouth nightly      predniSONE (DELTASONE) 5 MG tablet Take 1 tablet by mouth 2 times daily      fluticasone (FLONASE) 50 MCG/ACT nasal spray 1 spray by Nasal route daily 1 Bottle 1    montelukast (SINGULAIR) 10 MG tablet Take 10 mg by mouth nightly      Meloxicam (MOBIC PO) Take 15 mg by mouth daily       AMLODIPINE BESYLATE PO Take 10 mg by mouth daily       levothyroxine (SYNTHROID) 25 MCG tablet Take 25 mcg by mouth Daily      sertraline (ZOLOFT) 25 MG tablet Take 25 mg by mouth daily      gemfibrozil (LOPID) 600 MG tablet Take 600 mg by mouth 2 times daily (before meals)      losartan (COZAAR) 25 MG tablet Take 0.5 tablets by mouth daily (Patient taking differently: Take 25 mg by mouth daily ) 45 tablet 2    carvedilol (COREG) 12.5 MG tablet TAKE 1 TABLET BY MOUTH TWICE DAILY 180 tablet 1    omeprazole (PRILOSEC) 20 MG delayed release capsule Take 20 mg by mouth daily  0  Multiple Vitamins-Minerals (THERAPEUTIC MULTIVITAMIN-MINERALS) tablet Take 1 tablet by mouth daily      spironolactone (ALDACTONE) 25 MG tablet Take 1 tablet by mouth daily 30 tablet 3    glipiZIDE (GLUCOTROL XL) 2.5 MG extended release tablet Take 2.5 mg by mouth daily      aspirin 81 MG tablet Take 81 mg by mouth daily      pravastatin (PRAVACHOL) 40 MG tablet TAKE ONE TABLET BY MOUTH EVERY DAY AT BEDTIME 90 tablet 3     No current facility-administered medications for this visit. Reviewed with patient and will remain unchanged except as mentioned in A/P  PHYSICAL EXAM     Vitals:    07/02/20 1104   BP: 132/66   Pulse: 60   SpO2: 97%       Gen Alert, coop, no distress Heart  Rrr, 1/6   Head NC, AT, no abnorm Abd  Soft, NT, +BS, no mass, no OM   Eyes PER, conj/corn clear Ext  Ext nl, AT, no C/C/E   Nose Nares nl, no drain, NT Pulse 2+ and symmetric   Throat Lips, mucosa, tongue nl Skin Col/text/turg nl, no vis rash/les   Neck S/S, TM, NT, no bruit/JVD Psych Nl mood and affect   Lung CTA-B, unlabored, no DTP Lymph   No cervical or axillary LA   Ch wall NT, no deform Neuro  Nl gross M/S exam     ASSESSMENT AND PLAN      *AS   Date EF Detail   Sx   No concerning   Hx 6/18  TAVR 29 mm ES3   NYHA   II   TTE 12/17 6/18 7/18 7/19 7/20 40%  40%  40%  45% AS MG 37  TAVR MG 9  TAVR MG 12mmHg  TAVR MG 18   STTE 5/18 40% Sev AS MG 46   Plan   Doing well. No concerning sx   *CAD   Date EF Results   Sx   No concerning   LHC 5/18  50% mid LAD, 50% mid Cx, 50% mid RCA Pedro Lordfucci)   MPI 12/17 35% Fixed inferior apical defect, no ischemia   Plan   Continue aggressive medical treatment at doses above   *BRADYCARDIA  Hx PPM prior to TAVR 6/16   Plan Per EP  *OBESITY  Status Uncontrolled with a BMI of Body mass index is 39.16 kg/m².   Plan Counseled on diet, exercise, weight loss options in detail  *COMPLIANCE  Status Compliant  Plan Discussed importance of compliance with meds/diet/salt/exercise; avoid tob/alc/drugs; patient verbalized understanding  *FOLLOWUP  3 months with NP    [address] Pool Adams, am scribing for and in the presence of Pretty Massey MD.   SignedPool 07/01/20 9:09 AM   Provider Lb Mercer is working as a scribe for and in the presence of fidencio Massey MD). Working as a scribe, Pool Yanes may have prepopulated components of this note with my historical  intellectual property under my direct supervision. Any additions to this intellectual property were performed in my presence and at my direction. Furthermore, the content and accuracy of this note have been reviewed by me Pretty Massey MD).  7/2/2020 11:12 AM        If you have questions, please do not hesitate to call me. I look forward to following Radha Galarza along with you.     Sincerely,        Esau Martinez MD

## 2020-07-08 ENCOUNTER — TELEPHONE (OUTPATIENT)
Dept: PULMONOLOGY | Age: 85
End: 2020-07-08

## 2020-07-08 NOTE — TELEPHONE ENCOUNTER
Spoke with pt's son. Pt has some letters sent to him from DME about picking up the unit. Explained to son sometimes this comes from billing and the local DME will not  the unit. Pt has a f/u on 07/20/2020 and it needs to be virtual to be in compliance with insurance. Son is willing to help pt with call. Spoke with Baylee Villasenor at [employer] [facility] is aware of the issues and an appeal is pending with insurance.

## 2020-07-14 ENCOUNTER — TELEPHONE (OUTPATIENT)
Dept: CARDIOLOGY CLINIC | Age: 85
End: 2020-07-14

## 2020-07-14 NOTE — TELEPHONE ENCOUNTER
Pt states when he had echo, they put a metal thing on his rib and it was painful, He told them. States he has had pain in his ribs, shoulder, back and neck ever since.  Please call to advise

## 2020-07-14 NOTE — TELEPHONE ENCOUNTER
Tried to call pt back but no answer and mailbox full. Let pt know that he can take Tylenol for discomfort as sometimes ribs can be sore after echo. Let him know that echo's do not cause any damage/ short term or long term pain.  Julissa PINTO

## 2020-07-20 ENCOUNTER — VIRTUAL VISIT (OUTPATIENT)
Dept: PULMONOLOGY | Age: 85
End: 2020-07-20
Payer: MEDICARE

## 2020-07-20 PROCEDURE — 4040F PNEUMOC VAC/ADMIN/RCVD: CPT | Performed by: NURSE PRACTITIONER

## 2020-07-20 PROCEDURE — 99214 OFFICE O/P EST MOD 30 MIN: CPT | Performed by: NURSE PRACTITIONER

## 2020-07-20 PROCEDURE — 1123F ACP DISCUSS/DSCN MKR DOCD: CPT | Performed by: NURSE PRACTITIONER

## 2020-07-20 PROCEDURE — G8427 DOCREV CUR MEDS BY ELIG CLIN: HCPCS | Performed by: NURSE PRACTITIONER

## 2020-07-20 ASSESSMENT — SLEEP AND FATIGUE QUESTIONNAIRES
HOW LIKELY ARE YOU TO NOD OFF OR FALL ASLEEP WHILE SITTING INACTIVE IN A PUBLIC PLACE: 0
ESS TOTAL SCORE: 3
HOW LIKELY ARE YOU TO NOD OFF OR FALL ASLEEP IN A CAR, WHILE STOPPED FOR A FEW MINUTES IN TRAFFIC: 0
HOW LIKELY ARE YOU TO NOD OFF OR FALL ASLEEP WHILE SITTING AND TALKING TO SOMEONE: 0
HOW LIKELY ARE YOU TO NOD OFF OR FALL ASLEEP WHILE SITTING QUIETLY AFTER LUNCH WITHOUT ALCOHOL: 0
HOW LIKELY ARE YOU TO NOD OFF OR FALL ASLEEP WHILE WATCHING TV: 3
HOW LIKELY ARE YOU TO NOD OFF OR FALL ASLEEP WHEN YOU ARE A PASSENGER IN A CAR FOR AN HOUR WITHOUT A BREAK: 0
HOW LIKELY ARE YOU TO NOD OFF OR FALL ASLEEP WHILE SITTING AND READING: 0
HOW LIKELY ARE YOU TO NOD OFF OR FALL ASLEEP WHILE LYING DOWN TO REST IN THE AFTERNOON WHEN CIRCUMSTANCES PERMIT: 0

## 2020-07-20 NOTE — PROGRESS NOTES
Dante Courtney MD, FAASM, [facility]  Mariela Maloney, MSN, RN, CNP     [address] [place] [address] SLEEP MEDICINE  [address] [address]  Dept: [phone]  Dept Fax: : Cheryl Carpio [facility] SLEEP MEDICINE  [address] [place] [address] Street [address] [phone]    Subjective:     Patient ID: Vicki Schumacher is a 80 y.o. male. Chief Complaint   Patient presents with    Sleep Apnea       HPI:      Sleep Medicine Video Visit    Pursuant to the emergency declaration under the [facility], [facility] waiver authority and the [employer] Act this Telephone Visit was insisted, with patient's consent, to reduce the patient's risk of exposure to COVID-19 and provide continuity of care for an established patient. Services were provided through a synchronous discussion over a telephone and/or Video chat to substitute for in-person clinic visit, and coded as such. While patient is at home. Machine Modem/Download Info:  Compliance (hours/night): 15 hrs/night  Download AHI (/hour): 4.7 /HR     Average IPAP Pressure: 16 cmH2O  Average EPAP Pressure: 11.4 cmH2O               AUTO BILEVEL - Settings (ResMed)  IPAP Max: 25 cmH2O  EPAP Min: 5 cmH2O  Pressure Support: 4               [place] - Total score: 3    Follow-up :     Last Visit : April 2020      Patient reports the listed chronic Co-morbidities: Obesity, HTN, CAD   are well controlled and stable at this time.      Subjective Health Changes: None      Over Night Oximetry: [] Yes  [] No  [x] NA [] WNL   Using O2: [] Yes  [] No  [x] NA   Patient is compliant with the machine  [x] Yes  [] No   Feeling rested when using the machine   [x] Yes  [] No     Pressure is comfortable with inspiration and expiration  [x] Yes  [] No     Noticed changes in pressure   [] Yes  [] No  [x] NA   Mask is fitting well  [x] Yes  [] No   Noting Mask Air Leak  [] Yes  [x] No   Having painful Aerophagia  [] Yes  [x] No   Nocturia   3-4  per night. Having  HA upon waking  [] Yes  [x] No   Dry mouth upon waking   Dry Nose  Dry Eyes  [x] Yes  [] No   Congestion upon waking   [] Yes  [x] No    Nose Bleeds  [] Yes  [x] No   Using Sleep Aides    [x] NA   Understands how to change humidification and/or tubing temperature for comfort while at home  [x] Yes  [] No     Difficulties falling asleep  [] Yes  [x] No   Difficulties staying asleep  [] Yes  [x] No   Approximate time to bed  8pm   Approximate wake time  6am   Taking Naps  frequent   If taking naps usual length  30+ min    If taking naps using the machine  [x] Yes  [] No  [] NA [] With and With out    Drowsy when driving  [] Yes  [x] No     Does patient carry a DOT/CDL  [] Yes  [x] No     Does patient carry FAA/[employer] License   [] Yes  [x] No      Any concerns noted with the machine at this time  [] Yes  [x] No        Diagnosis Orders   1. ABDI (obstructive sleep apnea)     2. Obesity (BMI 35.0-39.9 without comorbidity)     3. Essential hypertension     4. Coronary artery disease due to lipid rich plaque         The chronic medical conditions listed are directly related to the primary diagnosis listed above. The management of the primary diagnosis affects the secondary diagnosis and vice versa. Assessment/Plan:     Obesity (BMI 35.0-39.9 without comorbidity)  Chronic-Stable. Encouraged him to work on weight loss through diet and exercise. Coronary artery disease involving native coronary artery of native heart without angina pectoris  Chronic- Stable. Cont meds per PCP and other physicians. Essential hypertension  Chronic- Stable. Cont meds per PCP and other physicians. Coronary artery disease due to lipid rich plaque  Chronic- Stable. Cont meds per PCP and other physicians.       ABDI (obstructive sleep apnea)  Reviewed compliance download with pt. Supplies and parts as needed for his machine. These are medically necessary. Continue medications per his PCP and other physicians. Limit caffeine use after 3pm.  Encouraged him to work on weight loss through diet and exercise. Diagnoses of Obesity (BMI 35.0-39.9 without comorbidity), Coronary artery disease involving native coronary artery of native heart without angina pectoris, Essential hypertension, and Coronary artery disease due to lipid rich plaque were pertinent to this visit. The chronic medical conditions listed are directly related to the primary diagnosis listed above. The management of the primary diagnosis affects the secondary diagnosis and vice versa. The primary encounter diagnosis was ABDI (obstructive sleep apnea). Diagnoses of Obesity (BMI 35.0-39.9 without comorbidity), Essential hypertension, and Coronary artery disease due to lipid rich plaque were also pertinent to this visit. The chronic medical conditions listed are directly related to the primary diagnosis listed above. The management of the primary diagnosis affects the secondary diagnosis and vice versa. - Educated patient and reviewed compliance download with pt.    -Supplies and parts as needed for his machine, these are medically necessary.    - Patient using [employer] for supplies  -Continue medications per his PCP and other physicians.   -Limit caffeine use after 3pm.    -Encouraged him to work on weight loss through diet and exercise. -  Patient able to access video feed. Visit completed via video chat communications. 20 min spent with patient.   - Education on sleep hygiene, and patient would like machine EPAP 4, educated on the potential to under treat the ABDI   -F/U: 6 month. No orders of the defined types were placed in this encounter. No orders of the defined types were placed in this encounter. No orders of the defined types were placed in this encounter.       Lisa Jaime Hunt, MSN, RN, CNP

## 2020-07-20 NOTE — ASSESSMENT & PLAN NOTE
Reviewed compliance download with pt. Supplies and parts as needed for his machine. These are medically necessary. Continue medications per his PCP and other physicians. Limit caffeine use after 3pm.  Encouraged him to work on weight loss through diet and exercise. Diagnoses of Obesity (BMI 35.0-39.9 without comorbidity), Coronary artery disease involving native coronary artery of native heart without angina pectoris, Essential hypertension, and Coronary artery disease due to lipid rich plaque were pertinent to this visit. The chronic medical conditions listed are directly related to the primary diagnosis listed above. The management of the primary diagnosis affects the secondary diagnosis and vice versa.

## 2020-07-22 ENCOUNTER — TELEPHONE (OUTPATIENT)
Dept: CARDIOLOGY CLINIC | Age: 85
End: 2020-07-22

## 2020-08-18 ENCOUNTER — NURSE ONLY (OUTPATIENT)
Dept: CARDIOLOGY CLINIC | Age: 85
End: 2020-08-18
Payer: MEDICARE

## 2020-08-18 PROCEDURE — 93294 REM INTERROG EVL PM/LDLS PM: CPT | Performed by: INTERNAL MEDICINE

## 2020-08-18 PROCEDURE — 93296 REM INTERROG EVL PM/IDS: CPT | Performed by: INTERNAL MEDICINE

## 2020-08-18 NOTE — LETTER
[address] [facility] [phone]  [address] [phone]    Pacemaker/Defibrillator Clinic          08/18/20        William Card  [address] [place] [address]        Dear William Card    This letter is to inform you that we received the transmission from your monitor at home that checks your implanted heart device. The next date you should transmit will be 11/17. If your report requires attention, we will notify you. Please be aware that the remote device transmission sites are periodically monitored only during regular business hours during which simultaneous in-office device clinics are being run. If your transmission requires attention, we will contact you as soon as possible. Thank you.             [address]

## 2020-08-26 LAB
GLUCOSE BLD-MCNC: 163 MG/DL (ref 70–100)
GLUCOSE BLD-MCNC: 210 MG/DL (ref 70–100)
GLUCOSE BLD-MCNC: 231 MG/DL (ref 70–100)

## 2020-08-27 LAB
ALBUMIN SERPL-MCNC: 3.6 G/DL (ref 3.5–5.7)
ANION GAP SERPL CALCULATED.3IONS-SCNC: 12 MMOL/L (ref 3–16)
BILIRUBIN URINE: NEGATIVE
BUN BLDV-MCNC: 45 MG/DL (ref 7–25)
CALCIUM SERPL-MCNC: 8.8 MG/DL (ref 8.6–10.3)
CHLORIDE BLD-SCNC: 89 MMOL/L (ref 98–110)
CHLORIDE URINE: <15 MMOL/L
CLARITY: ABNORMAL
CO2: 25 MMOL/L (ref 21–33)
COLOR: YELLOW
CREAT SERPL-MCNC: 2.25 MG/DL (ref 0.6–1.3)
CREATININE URINE: 116.49 MG/DL
ERYTHROCYTES URINE: NEGATIVE
GFR, ESTIMATED: 25 SEE NOTE.
GFR, ESTIMATED: 29 SEE NOTE.
GLUCOSE BLD-MCNC: 156 MG/DL (ref 70–100)
GLUCOSE BLD-MCNC: 251 MG/DL (ref 70–100)
GLUCOSE URINE: NEGATIVE MG/DL
HCT VFR BLD CALC: 33.8 % (ref 38.5–50)
HEMOGLOBIN: 12 G/DL (ref 13.2–17.1)
KETONES, URINE: NEGATIVE MG/DL
LEUKOCYTE ESTERASE, URINE: NEGATIVE
MCH RBC QN AUTO: 32 PG (ref 27–33)
MCHC RBC AUTO-ENTMCNC: 35.6 G/DL (ref 32–36)
MCV RBC AUTO: 89.9 FL (ref 80–100)
NITRITE, URINE: NEGATIVE
OSMOLALITY CALCULATION: 277 MOSM/KG (ref 278–305)
PDW BLD-RTO: 13.4 % (ref 11–15)
PHOSPHORUS: 4.1 MG/DL (ref 2.1–4.5)
PLATELET # BLD: 230 10E3/UL (ref 140–400)
PMV BLD AUTO: 8.2 FL (ref 7.5–11.5)
POC PH ARTERIAL: 5 (ref 5–8)
POTASSIUM SERPL-SCNC: 4 MMOL/L (ref 3.5–5.3)
POTASSIUM, UR: 63.9 MMOL/L
PROTEIN UA: NEGATIVE MG/DL
RBC # BLD: 3.76 10E6/UL (ref 4.2–5.8)
SODIUM BLD-SCNC: 126 MMOL/L (ref 133–146)
SODIUM URINE: 11 MMOL/L
SPECIFIC GRAVITY UA: 1.02 (ref 1–1.03)
URIC ACID, SERUM: 10.9 MG/DL (ref 3.8–8.7)
UROBILINOGEN, URINE: <2 MG/DL (ref 0.2–1.9)
WBC # BLD: 7 10E3/UL (ref 3.8–10.8)

## 2020-09-10 ENCOUNTER — TELEPHONE (OUTPATIENT)
Dept: CARDIOLOGY CLINIC | Age: 85
End: 2020-09-10

## 2020-09-10 NOTE — TELEPHONE ENCOUNTER
Pt son calling wants to let DCE and NPRG that his father is in [facility] RN phone number is [phone] son is asking for a call back to make sure this message gets relayed and his doctors know.  pls call to advise thank you

## 2020-09-11 NOTE — TELEPHONE ENCOUNTER
Spoke to the pt's son-gave instructions. The V V would need to be arranged through the facility. He is on a two week quarantine.

## 2020-09-11 NOTE — TELEPHONE ENCOUNTER
Please let son know I have seen the message  We can do a VV next week as I see they have cancelled the OV

## 2020-09-17 ENCOUNTER — VIRTUAL VISIT (OUTPATIENT)
Dept: CARDIOLOGY CLINIC | Age: 85
End: 2020-09-17
Payer: MEDICARE

## 2020-09-17 PROCEDURE — 4040F PNEUMOC VAC/ADMIN/RCVD: CPT | Performed by: CLINICAL NURSE SPECIALIST

## 2020-09-17 PROCEDURE — 1036F TOBACCO NON-USER: CPT | Performed by: CLINICAL NURSE SPECIALIST

## 2020-09-17 PROCEDURE — G8427 DOCREV CUR MEDS BY ELIG CLIN: HCPCS | Performed by: CLINICAL NURSE SPECIALIST

## 2020-09-17 PROCEDURE — 99214 OFFICE O/P EST MOD 30 MIN: CPT | Performed by: CLINICAL NURSE SPECIALIST

## 2020-09-17 PROCEDURE — 1123F ACP DISCUSS/DSCN MKR DOCD: CPT | Performed by: CLINICAL NURSE SPECIALIST

## 2020-09-17 PROCEDURE — G8417 CALC BMI ABV UP PARAM F/U: HCPCS | Performed by: CLINICAL NURSE SPECIALIST

## 2020-09-17 RX ORDER — SPIRONOLACTONE 25 MG/1
12.5 TABLET ORAL DAILY
Qty: 30 TABLET | Refills: 0 | Status: SHIPPED | OUTPATIENT
Start: 2020-09-17

## 2020-09-17 RX ORDER — ONDANSETRON 4 MG/1
TABLET, FILM COATED ORAL
COMMUNITY
Start: 2020-06-30

## 2020-09-17 RX ORDER — PREDNISONE 1 MG/1
2.5 TABLET ORAL DAILY
Qty: 30 TABLET | Refills: 0 | Status: SHIPPED
Start: 2020-09-17

## 2020-09-17 RX ORDER — LOSARTAN POTASSIUM 25 MG/1
12.5 TABLET ORAL DAILY
Qty: 45 TABLET | Refills: 0 | Status: SHIPPED
Start: 2020-09-17

## 2020-09-17 RX ORDER — OXYCODONE HYDROCHLORIDE 5 MG/1
5 TABLET ORAL EVERY 8 HOURS PRN
COMMUNITY

## 2020-09-17 RX ORDER — LORATADINE 10 MG/1
10 CAPSULE, LIQUID FILLED ORAL DAILY
COMMUNITY

## 2020-09-17 NOTE — Clinical Note
Looks like you saw this gentleman at [facility] 8/24-9/6 for hyponatremia  Creat now 1.9 sodium 128  I cut back on his aldactone and losartan and asked for labs next week.   Ml Araujo should be calling to make VV with you  Thanks  Elizabeth

## 2020-09-17 NOTE — PROGRESS NOTES
[address]  Progress Note    Primary Care Doctor:  Yina Holloway MD    Chief Complaint   Patient presents with    Follow-up     depressed    Congestive Heart Failure    Cardiomyopathy    Fatigue        History of Present Illness:  80 y.o. male with history of 80year old male with history of chronic combined s/dHF, HTN, DM, CAD, ABDI on cpap, AS s/p TAVR 6/18. [facility] 5/18 with non obstructive CAD. Hospitalized 3/15-18 with weakness and fatigue, treated for HF exacerbation possibly secondary to indiscretion with dietary and fluids. Discharge weight was 261. Lisinopril was changed to losartan due to cough    I had the pleasure of virtual visit with Candy Gowers in follow up for chronic combined s/dHF. He is at [facility] and is being helped by Mat Joel the nurse. He was admitted to [facility] 8/24-9/6 after falling off his rocker, hyponatremia 130->119->135 (samsca), EGD done for esophageal stenosis (given botox). His weight at discharge was 268 and currently is 264. Per the nurse, he had labs done which show sodium 128 potassium 3.7 creat 2.2->1.9 (at discharge creat was 1.26). He is not sob and has very little ankle edema bnp 114.5 Hgb 11.9. He was seen by nephrology while in the hospital.  /60. He is complaining of depression. Per nurse he is confused but not increased. He is not able to give me answers to my questions. Past Medical History:   has a past medical history of Acute combined systolic and diastolic congestive heart failure ([address] Allergic, Anemia, Anxiety, Aortic stenosis, Arthritis, BPH (benign prostatic hypertrophy), Bradycardia, CAD (coronary artery disease), Depression, Diabetes mellitus ([address] Erectile dysfunction, Hyperlipidemia, Hypertension, Nasal defect, Obesity, ABDI (obstructive sleep apnea), Prostate CA ([address] Prostate cancer ([address] SOB (shortness of breath) on exertion, and Unspecified sleep apnea.   Surgical History:   has a past surgical history that includes hernia repair; Appendectomy; Total shoulder arthroplasty; Colonoscopy; Cholecystectomy, laparoscopic (12/30/2011); Percutaneous Transluminal Coronary Angio; joint replacement; Tympanostomy tube placement ([id number]); Cosmetic surgery; fracture surgery; myringotomy (Left, 8/12/13); Tonsillectomy; and Anomalous venous return repair. Social History:   reports that he has never smoked. He has never used smokeless tobacco. He reports that he does not drink alcohol or use drugs. Family History:   Family History   Problem Relation Age of Onset    Heart Disease Father     Early Death Father     Coronary Art Dis Other     Sleep Apnea Son        Home Medications:  Prior to Admission medications    Medication Sig Start Date End Date Taking? Authorizing Provider   loratadine (CLARITIN) 10 MG capsule Take 10 mg by mouth daily   Yes Historical Provider, MD   ondansetron (ZOFRAN) 4 MG tablet  6/30/20  Yes Historical Provider, MD   oxyCODONE (ROXICODONE) 5 MG immediate release tablet Take 5 mg by mouth every 8 hours as needed for Pain.    Yes Historical Provider, MD   predniSONE (DELTASONE) 5 MG tablet Take 0.5 tablets by mouth daily 9/17/20  Yes NIKHIL Bhandari   furosemide (LASIX) 40 MG tablet 80 mg in am and 40 mg  Patient taking differently: 40 mg 2 times daily  5/27/20  Yes NIKHIL Sarah   fluticasone (FLONASE) 50 MCG/ACT nasal spray 1 spray by Nasal route daily 2/19/20  Yes Sarah Weston MD   montelukast (SINGULAIR) 10 MG tablet Take 10 mg by mouth nightly   Yes Historical Provider, MD   AMLODIPINE BESYLATE PO Take 10 mg by mouth daily    Yes Historical Provider, MD   levothyroxine (SYNTHROID) 25 MCG tablet Take 25 mcg by mouth Daily   Yes Historical Provider, MD   sertraline (ZOLOFT) 25 MG tablet Take 12.5 mg by mouth daily    Yes Historical Provider, MD   gemfibrozil (LOPID) 600 MG tablet Take 600 mg by mouth 2 times daily (before meals)   Yes Historical Provider, MD losartan (COZAAR) 25 MG tablet Take 0.5 tablets by mouth daily  Patient taking differently: Take 25 mg by mouth daily  6/24/19  Yes NIKHIL Aguilar - CNS   carvedilol (COREG) 12.5 MG tablet TAKE 1 TABLET BY MOUTH TWICE DAILY 5/15/19  Yes NIKHIL Sarah - CNS   omeprazole (PRILOSEC) 20 MG delayed release capsule Take 20 mg by mouth daily 3/27/19  Yes Historical Provider, MD   spironolactone (ALDACTONE) 25 MG tablet Take 1 tablet by mouth daily 3/19/19  Yes NIKHIL Sosa - CNP   glipiZIDE (GLUCOTROL XL) 2.5 MG extended release tablet Take 5 mg by mouth daily    Yes Historical Provider, MD   aspirin 81 MG tablet Take 81 mg by mouth daily   Yes Historical Provider, MD   pravastatin (PRAVACHOL) 40 MG tablet TAKE ONE TABLET BY MOUTH EVERY DAY AT BEDTIME 8/7/14  Yes Susana Porras MD   traZODone (DESYREL) 50 MG tablet Take 1 tablet by mouth nightly 3/27/20   Historical Provider, MD   Multiple Vitamins-Minerals (THERAPEUTIC MULTIVITAMIN-MINERALS) tablet Take 1 tablet by mouth daily    Historical Provider, MD        Allergies:  Hydralazine hcl; Neosporin [neomycin-bacitracin zn-polymyx]; Sulfa antibiotics; and Codeine     Review of Systems:   · Constitutional: there has been no unanticipated weight loss. There's been no change in energy level, sleep pattern, or activity level. depression  · Eyes: No visual changes or diplopia. No scleral icterus. · ENT: No Headaches, hearing loss or vertigo. No mouth sores or sore throat. · Cardiovascular: Reviewed in HPI  · Respiratory: No cough or wheezing, no sputum production. No hematemesis. · Gastrointestinal: No abdominal pain, appetite loss, blood in stools. No change in bowel or bladder habits. · Genitourinary: No dysuria, trouble voiding, or hematuria. · Musculoskeletal:  No gait disturbance, weakness or joint complaints. · Integumentary: No rash or pruritis.   · Neurological: No headache, diplopia, change in muscle strength, numbness or tingling. No change in gait, balance, coordination, mood, affect, memory, mentation, behavior. · Psychiatric: No anxiety, no depression. · Endocrine: No malaise, fatigue or temperature intolerance. No excessive thirst, fluid intake, or urination. No tremor. · Hematologic/Lymphatic: No abnormal bruising or bleeding, blood clots or swollen lymph nodes. · Allergic/Immunologic: No nasal congestion or hives. Physical Examination:    Patient-Reported Vitals 9/17/2020   Patient-Reported Weight 264   Patient-Reported Height 5'10\"   Patient-Reported Systolic 066   Patient-Reported Diastolic 60   Patient-Reported Pulse 60   Patient-Reported SpO2 94%        There were no vitals filed for this visit. TELEHEALTH EVALUATION -- Audio/Visual (During [facility]-69 public health emergency)      PHYSICAL EXAMINATION:  [ INSTRUCTIONS:  \"[x]\" Indicates a positive item  \"[]\" Indicates a negative item  -- DELETE ALL ITEMS NOT EXAMINED]  Vital Signs: (As obtained by patient/caregiver or practitioner observation)    Blood pressure-  Heart rate-    Respiratory rate-    Temperature-  Pulse oximetry-     Constitutional: [x] Appears well-developed and well-nourished [x] No apparent distress      [] Abnormal-   Mental status  [x] Alert and awake  [] Oriented to person/place/time [x]Able to follow commands      Eyes:  EOM    [x]  Normal  [] Abnormal-  Sclera  [x]  Normal  [] Abnormal -         Discharge [x]  None visible  [] Abnormal -    HENT:   [x] Normocephalic, atraumatic.   [] Abnormal   [x] Mouth/Throat: Mucous membranes are moist.     External Ears [x] Normal  [] Abnormal-     Neck: [x] No visualized mass     Pulmonary/Chest: [x] Respiratory effort normal.  [x] No visualized signs of difficulty breathing or respiratory distress        [] Abnormal-      Musculoskeletal:   [] Normal gait with no signs of ataxia         [x] Normal range of motion of neck        [] Abnormal-       Neurological:        [x] No Facial Asymmetry (Cranial nerve 7 motor function) (limited exam to video visit)          [] No gaze palsy        [] Abnormal-         Skin:        [x] No significant exanthematous lesions or discoloration noted on facial skin         [] Abnormal-            Psychiatric:       [] Normal Affect [] No Hallucinations depressed     [] Abnormal-     Other pertinent observable physical exam findings-     A caregiver was present when appropriate. Due to this being a TeleHealth encounter (During [facility] public health emergency), evaluation of the following organ systems was limited: Vitals/Constitutional/EENT/Resp/CV/GI//MS/Neuro/Skin/Heme-Lymph-Imm. Pursuant to the emergency declaration under the [address] [place] [address], [address] [place] [address] authority and the [employer] Act, this Virtual Visit was conducted with patient's (and/or legal guardian's) consent, to reduce the patient's risk of exposure to COVID-19 and provide necessary medical care. The patient (and/or legal guardian) has also been advised to contact this office for worsening conditions or problems, and seek emergency medical treatment and/or call 911 if deemed necessary. Patient identification was verified at the start of the visit: Yes    Total time spent on this encounter: Not billed by time    Services were provided through a video synchronous discussion virtually to substitute for in-person clinic visit. Patient and provider were located at their individual homes. --NIKHIL Vaca - CNS on 4/28/2020 at 2:40 PM    · An electronic signature was used to authenticate this note.     Lab Data:    CBC:   Lab Results   Component Value Date    WBC 7.0 08/27/2020    WBC 7.6 05/11/2020    WBC 7.0 03/15/2019    RBC 3.76 08/27/2020    RBC 4.35 05/11/2020    RBC 4.53 03/15/2019    RBC 4.41 05/29/2015    HGB 12.0 08/27/2020    HGB 13.6 05/11/2020    HGB 13.6 03/15/2019    HCT 33.8 08/27/2020    HCT 39.7 05/11/2020    HCT 39.3 03/15/2019    MCV 89.9 08/27/2020    MCV 91.3 05/11/2020    MCV 86.8 03/15/2019    RDW 13.4 08/27/2020    RDW 15.0 05/11/2020    RDW 13.9 03/15/2019     08/27/2020     05/11/2020     03/15/2019     BMP:  Lab Results   Component Value Date     08/27/2020     05/27/2020     05/11/2020    K 4.0 08/27/2020    K 4.4 05/27/2020    K 4.3 05/11/2020    K 5.4 06/13/2018    CL 89 08/27/2020    CL 94 05/27/2020     05/11/2020    CO2 25 08/27/2020    CO2 24 05/27/2020    CO2 23 05/11/2020    PHOS 4.1 08/27/2020    PHOS 3.8 01/04/2018    BUN 45 08/27/2020    BUN 36 05/27/2020    BUN 29 05/11/2020    CREATININE 2.25 08/27/2020    CREATININE 1.3 05/27/2020    CREATININE 1.2 05/11/2020     BNP:   Lab Results   Component Value Date    PROBNP 669 05/27/2020    PROBNP 868 05/11/2020    PROBNP 607 05/13/2019     Cardiac Imaging:  Echo 7/18/2019  Left ventricle - normal size, moderate concentric LVH, reduced function   with EF 45%, abnormal septal motion  Mitral valve - annular calcification, mild regurgitation  Left atrium - dilated  Aortic valve - well seated TAVR valve with mean gradient of 18mmHg, no   regurgitation  Tricuspid valve - mild regurgitation with PASP of 26mmHg  Pacemaker / ICD lead is visualized in the right atrium.     Echo 3/18/2019:  Summary   - S/P TAVR 6/12/2018.   -Left ventricular cavity size is normal with moderately severe concentric left ventricular hypertrophy.   -Overall left ventricular systolic function appears moderately reduced with an ejection fraction on the 30-35% range.   -There is abnormal septal motion possible due to RV pacing.   -Grade I diastolic dysfunction with normal LV filling pressures.   -Mitral annular calcification is present.   -Mild mitral regurgitation.   -A bioprosthetic artificial aortic valve appears well seated with a maximum   velocity of 2.7m/s and a mean gradient of 16mmHg.   -Mild tricuspid individual.    JUSTIN Crenshaw, 9/17/2020, 3:09 PM    QUALITY MEASURES  1. Tobacco Cessation Counseling: na  2. Retake of BP if >140/90:  na  3. Documentation to PCP/referring for new patient:  Sent to PCP at close of office visit  4. CAD patient on anti-platelet: yes  5. CAD patient on STATIN therapy:  na  6.  Patient with CHF and aFib on anticoagulation:  na

## 2020-09-29 ENCOUNTER — TELEPHONE (OUTPATIENT)
Dept: CARDIOLOGY CLINIC | Age: 85
End: 2020-09-29

## 2020-09-29 NOTE — TELEPHONE ENCOUNTER
Spoke to son to schedule 6 mo TAVR for December. States pt needs late morning early afternoon appt. Would it be possible to use the 11 am end spot on 12/3/20? Please advise.

## 2020-10-15 ENCOUNTER — TELEPHONE (OUTPATIENT)
Dept: CARDIOLOGY CLINIC | Age: 85
End: 2020-10-15

## 2020-10-15 NOTE — TELEPHONE ENCOUNTER
Deb Standard vv appointment today with Elizabeth. Patient fell yesterday and is  In the hospital at [facility]. Phone number for floor is [phone]. Spoke with Ronn Ram and he did not know if patient will go back to [facility] once discharged.   conor

## 2021-01-01 NOTE — PROGRESS NOTES
[address]  Progress Note    Primary Care Doctor:  Mendel Bers, MD    Chief Complaint   Patient presents with   [facility]     c/o being depressed, cries a lot. Denies cp, edema. c/o muscle tightness in chest, not sure if cold related    Shortness of Breath     with exertion    Dizziness     comes and goes; recently had wax taken out of left ear and has felt it since    Fatigue        History of Present Illness:  80 y.o. male with history of 80year old male with history of chronic combined s/dHF, HTN, DM, CAD, ABDI on cpap, AS s/p TAVR 6/18. [facility] 5/18 with non obstructive CAD. Hospitalized 3/15-18 with weakness and fatigue, treated for HF exacerbation possibly secondary to indiscretion with dietary and fluids. Discharge weight was 261. Lisinopril was changed to losartan due to cough    I had the pleasure of seeing Melissa Kulkarni in follow up for urgent visit for SOB. He is ambulatory and by his self today. He states that he has a cough with some clear phlegm which has been \"happening for ever\". He states that he is sob with activity. His biggest complaint is that of being depressed and that no one listens to him regarding his lower back/leg pain. His weight is up 4 pounds and he admits to eating and drinking more due to the depression. He has an appt in June for sleep evaluation and is interested in moving this to an earlier appt. He is also on lopressor which is not evident based treatment for sHF.       Past Medical History:   has a past medical history of Acute combined systolic and diastolic congestive heart failure ([address] Allergic, Anemia, Anxiety, Aortic stenosis, Arthritis, BPH (benign prostatic hypertrophy), Bradycardia, CAD (coronary artery disease), Depression, Diabetes mellitus ([address] Erectile dysfunction, Hyperlipidemia, Hypertension, Nasal defect, Obesity, Prostate CA ([address] Prostate cancer ([address] SOB (shortness of breath) on exertion, and Unspecified sleep apnea.  Surgical History:   has a past surgical history that includes hernia repair; Appendectomy; Total shoulder arthroplasty; Colonoscopy; Cholecystectomy, laparoscopic (12/30/2011); Percutaneous Transluminal Coronary Angio; joint replacement; Tympanostomy tube placement ([id number]); Cosmetic surgery; fracture surgery; and myringotomy (Left, 8/12/13). Social History:   reports that he has never smoked. He has never used smokeless tobacco. He reports that he does not drink alcohol or use drugs. Family History:   Family History   Problem Relation Age of Onset    Heart Disease Father     Early Death Father     Coronary Art Dis Other        Home Medications:  Prior to Admission medications    Medication Sig Start Date End Date Taking?  Authorizing Provider   omeprazole (PRILOSEC) 20 MG delayed release capsule Take 20 mg by mouth daily 3/27/19  Yes Historical Provider, MD   potassium chloride (MICRO-K) 10 MEQ extended release capsule Take 10 mEq by mouth daily   Yes Historical Provider, MD   Multiple Vitamins-Minerals (THERAPEUTIC MULTIVITAMIN-MINERALS) tablet Take 1 tablet by mouth daily   Yes Historical Provider, MD   Calcium Carbonate-Vit D-Min (CALCIUM-VITAMIN D-MINERALS) 600-800 MG-UNIT CHEW Take 1 tablet by mouth daily   Yes Historical Provider, MD   carvedilol (COREG) 12.5 MG tablet Take 1 tablet by mouth 2 times daily 5/13/19  Yes Elizabeth Alas APRN - CNS   furosemide (LASIX) 40 MG tablet Take 1 tablet by mouth 2 times daily 4/4/19  Yes NIKHIL Horn - CNP   amLODIPine (NORVASC) 10 MG tablet TAKE ONE TABLET BY MOUTH EVERY DAY 3/25/19  Yes NIKHIL Horn - CNP   losartan (COZAAR) 25 MG tablet Take 0.5 tablets by mouth daily 3/25/19  Yes Andria Barber APRN - CNP   spironolactone (ALDACTONE) 25 MG tablet Take 1 tablet by mouth daily 3/19/19  Yes Christal Easton APRN - CNP   clopidogrel (PLAVIX) 75 MG tablet TAKE 1 TABLET BY MOUTH EVERY DAY 3/4/19  Yes Maritza Zamora MD   azelastine (ASTELIN) 0.1 % nasal spray 2 sprays by Nasal route 2 times daily Use in each nostril as directed 7/27/18 7/27/19 Yes Rebecca Oviedo MD   fluticasone (FLONASE) 50 MCG/ACT nasal spray 1 spray by Nasal route daily  Patient taking differently: 1 spray by Nasal route daily as needed  3/28/18  Yes Rebecca Oviedo MD   glipiZIDE (GLUCOTROL XL) 2.5 MG extended release tablet Take 2.5 mg by mouth daily   Yes Historical Provider, MD   aspirin 81 MG tablet Take 81 mg by mouth daily   Yes Historical Provider, MD   pravastatin (PRAVACHOL) 40 MG tablet TAKE ONE TABLET BY MOUTH EVERY DAY AT BEDTIME 8/7/14  Yes Lois Martinez MD   gemfibrozil (LOPID) 600 MG tablet TAKE ONE TABLET BY MOUTH TWICE DAILY 6/24/13  Yes Lios Martinez MD   budesonide-formoterol [facility]) 160-4.5 MCG/ACT AERO Inhale 2 puffs into the lungs 2 times daily 12/30/17   Historical Provider, MD        Allergies:  Hydralazine hcl; Neosporin [neomycin-bacitracin zn-polymyx]; Sulfa antibiotics; and Codeine     Review of Systems:   · Constitutional: there has been no unanticipated weight loss. There's been no change in energy level, sleep pattern, or activity level. Depression, sob, clear phlegm and tired all the time  · Eyes: No visual changes or diplopia. No scleral icterus. · ENT: No Headaches, hearing loss or vertigo. No mouth sores or sore throat. · Cardiovascular: Reviewed in HPI  · Respiratory: No cough or wheezing, no sputum production. No hematemesis. · Gastrointestinal: No abdominal pain, appetite loss, blood in stools. No change in bowel or bladder habits. · Genitourinary: No dysuria, trouble voiding, or hematuria. · Musculoskeletal:  No gait disturbance, weakness or joint complaints. · Integumentary: No rash or pruritis. · Neurological: No headache, diplopia, change in muscle strength, numbness or tingling. No change in gait, balance, coordination, mood, affect, memory, mentation, behavior.   · Psychiatric: No anxiety, no depression. · Endocrine: No malaise, fatigue or temperature intolerance. No excessive thirst, fluid intake, or urination. No tremor. · Hematologic/Lymphatic: No abnormal bruising or bleeding, blood clots or swollen lymph nodes. · Allergic/Immunologic: No nasal congestion or hives. Physical Examination:    Vitals:    05/13/19 1113   BP: 136/62   Site: Left Upper Arm   Position: Sitting   Cuff Size: Large Adult   Pulse: 60   Resp: 17   SpO2: 95%   Weight: 262 lb (118.8 kg)   Height: 5' 10\" (1.778 m)        Constitutional and General Appearance: Warm and dry, no apparent distress, normal coloration  HEENT:  Normocephalic, atraumatic  Respiratory:  · Normal excursion and expansion without use of accessory muscles  · Resp Auscultation: Normal breath sounds without dullness  Cardiovascular:  · The apical impulses not displaced  · Heart tones are crisp and normal  · JVP normal cm H2O  · Regular rate and rhythm  · Peripheral pulses are symmetrical and full  · There is no clubbing, cyanosis of the extremities.   · No edema  · Pedal Pulses: 2+ and equal   Abdomen:  · No masses or tenderness  · Liver/Spleen: No Abnormalities Noted  Neurological/Psychiatric:  · Alert and oriented in all spheres  · Moves all extremities well  · Exhibits normal gait balance and coordination  · No abnormalities of mood, affect, memory, mentation, or behavior are noted    Lab Data:    CBC:   Lab Results   Component Value Date    WBC 7.0 03/15/2019    WBC 8.0 08/30/2018    WBC 6.3 07/19/2018    RBC 4.53 03/15/2019    RBC 4.46 08/30/2018    RBC 4.65 07/19/2018    RBC 4.41 05/29/2015    HGB 13.6 03/15/2019    HGB 13.6 08/30/2018    HGB 14.3 07/19/2018    HCT 39.3 03/15/2019    HCT 39.3 08/30/2018    HCT 42.0 07/19/2018    MCV 86.8 03/15/2019    MCV 88.1 08/30/2018    MCV 90.3 07/19/2018    RDW 13.9 03/15/2019    RDW 13.6 08/30/2018    RDW 14.2 07/19/2018     03/15/2019     08/30/2018     07/19/2018     BMP:  Lab Results mmHg.   -Dilated left atrium with a volume of 80 ml.   -The right ventricle is mildly enlarged.   -Pacer / ICD wire is visualized in the right heart.   -The right atrium appears mildly to moderately dilated. CURLY RETREAT 5/15/2018: Findings:                      LM       Normal              LAD     50% mid               Cx        50% mid              RCA     50% mid              LVG     Not performed              EDP     Not obtained  Intervention:  None    Assessment:    1. Chronic combined systolic and diastolic heart failure (HCC)on arb and aldactone antagonist    2. Coronary artery disease involving native coronary artery of native heart without angina pectoris    3. Essential hypertension    4. S/P TAVR (transcatheter aortic valve replacement)    5. ABDI (obstructive sleep apnea)    6. Cardiomyopathy, unspecified type [facility])         Plan:   Patient Instructions   1. Check blood work today  2. Continue all current medications  3. Will call sleep medicine to see if appt can be earlier and they will call  4. Stop lopressor and change to coreg 12.5 mg twice a day  5. Please call PCP for an appt regarding depression  6. Fluid restrictions of 64 ounces and low sodium diet of 2000 mg a day      I appreciate the opportunity of cooperating in the care of this individual.    Mayelin Giron, CNS, 5/13/2019, 8:49 PM    QUALITY MEASURES  1. Tobacco Cessation Counseling: na  2. Retake of BP if >140/90:  na  3. Documentation to PCP/referring for new patient:  Sent to PCP at close of office visit  4. CAD patient on anti-platelet: yes  5. CAD patient on STATIN therapy:  na  6.  Patient with CHF and aFib on anticoagulation:  na no